# Patient Record
Sex: MALE | Race: WHITE | Employment: FULL TIME | URBAN - METROPOLITAN AREA
[De-identification: names, ages, dates, MRNs, and addresses within clinical notes are randomized per-mention and may not be internally consistent; named-entity substitution may affect disease eponyms.]

---

## 2017-02-08 NOTE — TELEPHONE ENCOUNTER
Diabetes Medications: 10/28/16 A1C 6.9 - Refilled per protocol - Due for office visit in 2 months - 2 month supply given       Protocol Criteria:  · Appointment scheduled in the past 6 months or the next 3 months  · A1C < 7.5 in the past 6 months  · Creati

## 2017-04-20 ENCOUNTER — PATIENT OUTREACH (OUTPATIENT)
Dept: INTERNAL MEDICINE CLINIC | Facility: CLINIC | Age: 47
End: 2017-04-20

## 2017-04-27 ENCOUNTER — APPOINTMENT (OUTPATIENT)
Dept: LAB | Facility: HOSPITAL | Age: 47
End: 2017-04-27
Attending: INTERNAL MEDICINE
Payer: COMMERCIAL

## 2017-04-27 ENCOUNTER — OFFICE VISIT (OUTPATIENT)
Dept: INTERNAL MEDICINE CLINIC | Facility: CLINIC | Age: 47
End: 2017-04-27

## 2017-04-27 VITALS
BODY MASS INDEX: 30.64 KG/M2 | HEART RATE: 83 BPM | DIASTOLIC BLOOD PRESSURE: 82 MMHG | HEIGHT: 70 IN | WEIGHT: 214 LBS | SYSTOLIC BLOOD PRESSURE: 126 MMHG

## 2017-04-27 DIAGNOSIS — E11.9 TYPE 2 DIABETES MELLITUS WITHOUT COMPLICATION, WITHOUT LONG-TERM CURRENT USE OF INSULIN (HCC): Primary | ICD-10-CM

## 2017-04-27 DIAGNOSIS — E11.9 TYPE 2 DIABETES MELLITUS WITHOUT COMPLICATION, WITHOUT LONG-TERM CURRENT USE OF INSULIN (HCC): ICD-10-CM

## 2017-04-27 PROCEDURE — 83036 HEMOGLOBIN GLYCOSYLATED A1C: CPT

## 2017-04-27 PROCEDURE — 36415 COLL VENOUS BLD VENIPUNCTURE: CPT

## 2017-04-27 PROCEDURE — 99213 OFFICE O/P EST LOW 20 MIN: CPT | Performed by: INTERNAL MEDICINE

## 2017-04-27 PROCEDURE — 99212 OFFICE O/P EST SF 10 MIN: CPT | Performed by: INTERNAL MEDICINE

## 2017-04-27 RX ORDER — ATORVASTATIN CALCIUM 10 MG/1
TABLET, FILM COATED ORAL
Qty: 90 TABLET | Refills: 1 | Status: SHIPPED | OUTPATIENT
Start: 2017-04-27 | End: 2017-12-13

## 2017-04-27 NOTE — PROGRESS NOTES
Mgady Garner Thea is a 48year old male. Patient presents with:  Diabetes    HPI:   Mr. Robbin Byrd presents this morning for six-month follow-up of type 2 diabetes and dyslipidemia. He feels well.   He is checking his blood sugar readings at home abou retinopathy screening. Referral completed. He will schedule. Return visit in 6 months for recheck, physical and complete labs. - Ophtho Marselle  - Hemoglobin A1C; Future      The patient indicates understanding of these issues and agrees to the plan.

## 2017-04-27 NOTE — PATIENT INSTRUCTIONS
Await results of glycohemoglobin level. Please schedule an appointment for an eye exam with Ophthalmology. Please continue current medications, healthy diet and regular exercise. Schedule a physical in 6 months.

## 2017-05-09 NOTE — PROGRESS NOTES
Chart reviewed. Pt noted to have had Hb A1c completed on 4/27/17. PLAN:  CM will remove self from Care Team as pt Hb A1c is less than 8.

## 2017-08-08 ENCOUNTER — OFFICE VISIT (OUTPATIENT)
Dept: OPHTHALMOLOGY | Facility: CLINIC | Age: 47
End: 2017-08-08

## 2017-08-08 DIAGNOSIS — E11.9 DIABETES MELLITUS TYPE 2 WITHOUT RETINOPATHY (HCC): Primary | ICD-10-CM

## 2017-08-08 PROCEDURE — 92014 COMPRE OPH EXAM EST PT 1/>: CPT | Performed by: OPHTHALMOLOGY

## 2017-08-08 NOTE — PROGRESS NOTES
Sharona Sidhu is a 50year old male.     HPI:     HPI     Diabetic Eye Exam    Additional comments: Pt has been a diabetic for 3 1/2 years  3 1/2 years on pills/  0 years on Insulin   Pt checks his BS 2-3 x a week   Pt's last blood sugar was 102 this Allergies:    Penicillins             Swelling    ROS:       PHYSICAL EXAM:     Base Eye Exam     Visual Acuity (Snellen - Linear)       Right Left    Dist cc 20/20 20/20    Near cc 20/20 20/20    Correction:  Contacts          Tonometry (Applanation instructions:  Return in about 1 year (around 8/8/2018) for Diabetic eye exam.    8/8/2017  Scribed by: Juan Manuel Owen MD

## 2017-08-08 NOTE — PATIENT INSTRUCTIONS
Diabetes mellitus type 2 without retinopathy ([facility] [address]  Diabetes type II: no background of retinopathy, no signs of neovascularization noted. Discussed ocular and systemic benefits of blood sugar control.   Diagnosis and treatment discussed in detail with juan

## 2017-12-13 ENCOUNTER — LAB ENCOUNTER (OUTPATIENT)
Dept: LAB | Facility: HOSPITAL | Age: 47
End: 2017-12-13
Attending: INTERNAL MEDICINE
Payer: COMMERCIAL

## 2017-12-13 ENCOUNTER — OFFICE VISIT (OUTPATIENT)
Dept: INTERNAL MEDICINE CLINIC | Facility: CLINIC | Age: 47
End: 2017-12-13

## 2017-12-13 VITALS
SYSTOLIC BLOOD PRESSURE: 118 MMHG | BODY MASS INDEX: 30.35 KG/M2 | HEIGHT: 70 IN | DIASTOLIC BLOOD PRESSURE: 74 MMHG | HEART RATE: 80 BPM | WEIGHT: 212 LBS

## 2017-12-13 DIAGNOSIS — Z00.00 ANNUAL PHYSICAL EXAM: Primary | ICD-10-CM

## 2017-12-13 DIAGNOSIS — E11.9 TYPE 2 DIABETES MELLITUS WITHOUT COMPLICATION, WITHOUT LONG-TERM CURRENT USE OF INSULIN (HCC): ICD-10-CM

## 2017-12-13 DIAGNOSIS — E11.69 DYSLIPIDEMIA ASSOCIATED WITH TYPE 2 DIABETES MELLITUS (HCC): ICD-10-CM

## 2017-12-13 DIAGNOSIS — E78.5 DYSLIPIDEMIA ASSOCIATED WITH TYPE 2 DIABETES MELLITUS (HCC): ICD-10-CM

## 2017-12-13 PROCEDURE — 83036 HEMOGLOBIN GLYCOSYLATED A1C: CPT

## 2017-12-13 PROCEDURE — 36415 COLL VENOUS BLD VENIPUNCTURE: CPT

## 2017-12-13 PROCEDURE — 82570 ASSAY OF URINE CREATININE: CPT

## 2017-12-13 PROCEDURE — 82043 UR ALBUMIN QUANTITATIVE: CPT

## 2017-12-13 PROCEDURE — 80053 COMPREHEN METABOLIC PANEL: CPT

## 2017-12-13 PROCEDURE — 80061 LIPID PANEL: CPT

## 2017-12-13 PROCEDURE — 99396 PREV VISIT EST AGE 40-64: CPT | Performed by: INTERNAL MEDICINE

## 2017-12-13 PROCEDURE — 85027 COMPLETE CBC AUTOMATED: CPT

## 2017-12-13 RX ORDER — ATORVASTATIN CALCIUM 10 MG/1
TABLET, FILM COATED ORAL
Qty: 90 TABLET | Refills: 1 | Status: SHIPPED | OUTPATIENT
Start: 2017-12-13 | End: 2018-06-13

## 2017-12-13 NOTE — H&P
Briseyda Sidhu is a 50year old male who presents for a complete physical exam, as well as for follow-up of type 2 diabetes and dyslipidemia. HPI:   He feels well this morning. No specific issues for discussion.   Accu-Cheks performed about 4 times Father 76   • Slude Strand 83 Sister 43   • Diabetes Paternal Grandmother    • Diabetes Paternal Grandfather    • Heart Disease Paternal Grandfather      CHF age 76s      Social History:  Smoking status: Former Smoker exam  Check CMP CBC glycohemoglobin lipid profile and urine microalbumin today. Order sent. Continue current medications. Prescription refill for atorvastatin for 6 months sent to pharmacy.   Continue healthy diet, regular exercise and at least maintenan

## 2017-12-13 NOTE — PATIENT INSTRUCTIONS
Await results of today's blood and urine testing. Please continue current medications. Continue to eat healthy and exercise regularly. Return visit in 6 months.

## 2018-06-13 ENCOUNTER — APPOINTMENT (OUTPATIENT)
Dept: LAB | Facility: HOSPITAL | Age: 48
End: 2018-06-13
Attending: INTERNAL MEDICINE
Payer: COMMERCIAL

## 2018-06-13 ENCOUNTER — OFFICE VISIT (OUTPATIENT)
Dept: INTERNAL MEDICINE CLINIC | Facility: CLINIC | Age: 48
End: 2018-06-13

## 2018-06-13 VITALS
DIASTOLIC BLOOD PRESSURE: 80 MMHG | HEART RATE: 82 BPM | BODY MASS INDEX: 28.77 KG/M2 | HEIGHT: 70 IN | WEIGHT: 201 LBS | SYSTOLIC BLOOD PRESSURE: 116 MMHG

## 2018-06-13 DIAGNOSIS — E11.69 DYSLIPIDEMIA ASSOCIATED WITH TYPE 2 DIABETES MELLITUS (HCC): ICD-10-CM

## 2018-06-13 DIAGNOSIS — E78.5 DYSLIPIDEMIA ASSOCIATED WITH TYPE 2 DIABETES MELLITUS (HCC): ICD-10-CM

## 2018-06-13 DIAGNOSIS — E11.9 TYPE 2 DIABETES MELLITUS WITHOUT COMPLICATION, WITHOUT LONG-TERM CURRENT USE OF INSULIN (HCC): Primary | ICD-10-CM

## 2018-06-13 DIAGNOSIS — Z12.11 COLON CANCER SCREENING: ICD-10-CM

## 2018-06-13 PROCEDURE — 83036 HEMOGLOBIN GLYCOSYLATED A1C: CPT | Performed by: INTERNAL MEDICINE

## 2018-06-13 PROCEDURE — 99213 OFFICE O/P EST LOW 20 MIN: CPT | Performed by: INTERNAL MEDICINE

## 2018-06-13 PROCEDURE — 36415 COLL VENOUS BLD VENIPUNCTURE: CPT | Performed by: INTERNAL MEDICINE

## 2018-06-13 PROCEDURE — 99212 OFFICE O/P EST SF 10 MIN: CPT | Performed by: INTERNAL MEDICINE

## 2018-06-13 RX ORDER — ATORVASTATIN CALCIUM 10 MG/1
TABLET, FILM COATED ORAL
Qty: 90 TABLET | Refills: 1 | Status: SHIPPED | OUTPATIENT
Start: 2018-06-13 | End: 2018-09-16

## 2018-06-13 NOTE — PROGRESS NOTES
Marcelo Sidhu is a 50year old male. Patient presents with:  Diabetes    HPI:   Liz Winn presents this morning for six-month follow-up of diabetes and dyslipidemia. For the past 4 months he has been traveling extensively for his job.   Travel has n appearing well in no distress  /80 (BP Location: Left arm, Patient Position: Sitting, Cuff Size: large)   Pulse 82   Ht 5' 10\" (1.778 m)   Wt 201 lb (91.2 kg)   BMI 28.84 kg/m²   LUNGS: Resonant to percussion and clear to auscultation  CARDIAC: Rhyt

## 2018-06-13 NOTE — PATIENT INSTRUCTIONS
Await results of today's glycohemoglobin. Continue current medication. Continue healthy diet and regular exercise with hopefully additional weight loss. Please schedule a physical in 6 months.   Schedule an appointment with GI for a screening colonoscopy

## 2018-08-10 ENCOUNTER — TELEPHONE (OUTPATIENT)
Dept: OTHER | Age: 48
End: 2018-08-10

## 2018-08-10 NOTE — TELEPHONE ENCOUNTER
Pt states he monitors his blood sugar 3-4 times a week and his monitor is failing. He is asking if there is a monitor available that doesn't require pricking the finger for a drop of blood.  Pt is going to review with pharmacy and have them send request to

## 2018-08-16 RX ORDER — ATORVASTATIN CALCIUM 10 MG/1
TABLET, FILM COATED ORAL
Qty: 90 TABLET | Refills: 1
Start: 2018-08-16

## 2018-08-16 NOTE — TELEPHONE ENCOUNTER
Cholesterol Medication Protocol Passed8/16 11:13 AM   ALT in past 12 months    LDL in past 12 months    Last ALT < 80    Last LDL < 130    Appointment in past 12 or next 3 months     [address]

## 2018-09-16 RX ORDER — ATORVASTATIN CALCIUM 10 MG/1
TABLET, FILM COATED ORAL
Qty: 90 TABLET | Refills: 0 | Status: SHIPPED | OUTPATIENT
Start: 2018-09-16 | End: 2018-12-08

## 2018-09-17 NOTE — TELEPHONE ENCOUNTER
Diabetes Medications:  Failed per nursing protocol - please advise on refill request     Protocol Criteria:  · Appointment scheduled in the past 6 months or the next 3 months  · A1C < 7.5 in the past 6 months  · Creatinine in the past 12 months  · Creatini ago Annual physical exam    Oscar Sims MD    Office Visit    1 year ago Diabetes mellitus type 2 without retinopathy [facility])    [facility]hthalmology Kateryna Mazariegos MD    Office Visit

## 2018-09-19 ENCOUNTER — OFFICE VISIT (OUTPATIENT)
Dept: OPHTHALMOLOGY | Facility: CLINIC | Age: 48
End: 2018-09-19
Payer: COMMERCIAL

## 2018-09-19 DIAGNOSIS — H52.13 MYOPIA WITH PRESBYOPIA OF BOTH EYES: ICD-10-CM

## 2018-09-19 DIAGNOSIS — E11.9 DIABETES MELLITUS TYPE 2 WITHOUT RETINOPATHY (HCC): Primary | ICD-10-CM

## 2018-09-19 DIAGNOSIS — H52.4 MYOPIA WITH PRESBYOPIA OF BOTH EYES: ICD-10-CM

## 2018-09-19 PROCEDURE — 92014 COMPRE OPH EXAM EST PT 1/>: CPT | Performed by: OPHTHALMOLOGY

## 2018-09-19 NOTE — PATIENT INSTRUCTIONS
Diabetes mellitus type 2 without retinopathy ([facility] [address]  Diabetes type II: no background of retinopathy, no signs of neovascularization noted. Discussed ocular and systemic benefits of blood sugar control.   Diagnosis and treatment discussed in detail with juan

## 2018-09-19 NOTE — PROGRESS NOTES
Briseyda Oni Uribebayleelemuel is a 46year old male.     HPI:     HPI     Diabetic Eye Exam      Additional comments: Pt has been a diabetic for 4 years  4 years on pills/ 0 years on Insulin   Pt checks his BS 2 times a day   Pt's last blood sugar was 135 this max (BLOOD GLUCOSE TEST) In Vitro Strip Check Blood sugar 3 times weekly- Accuchek Addie monitor Disp: 100 strip Rfl: 3   atorvastatin 10 MG Oral Tab TAKE 1 TABLET BY MOUTH DAILY Disp: 90 tablet Rfl: 0   Omega-3 Fatty Acids (FISH OIL) 1200 MG Oral Cap Take 1 ca treatment discussed in detail with patient. To consider referral to endocrinologist to improve A1C levels. Myopia with presbyopia of both eyes  Continue same glasses. No orders of the defined types were placed in this encounter.       Meds This

## 2018-09-19 NOTE — ASSESSMENT & PLAN NOTE
Diabetes type II: no background of retinopathy, no signs of neovascularization noted. Discussed ocular and systemic benefits of blood sugar control. Diagnosis and treatment discussed in detail with patient.   To consider referral to endocrinologist to imp

## 2018-09-24 ENCOUNTER — OFFICE VISIT (OUTPATIENT)
Dept: GASTROENTEROLOGY | Facility: CLINIC | Age: 48
End: 2018-09-24
Payer: COMMERCIAL

## 2018-09-24 ENCOUNTER — TELEPHONE (OUTPATIENT)
Dept: GASTROENTEROLOGY | Facility: CLINIC | Age: 48
End: 2018-09-24

## 2018-09-24 VITALS
HEART RATE: 93 BPM | WEIGHT: 204 LBS | HEIGHT: 69 IN | BODY MASS INDEX: 30.21 KG/M2 | DIASTOLIC BLOOD PRESSURE: 84 MMHG | SYSTOLIC BLOOD PRESSURE: 132 MMHG

## 2018-09-24 DIAGNOSIS — Z12.11 COLON CANCER SCREENING: Primary | ICD-10-CM

## 2018-09-24 DIAGNOSIS — Z12.12 SCREENING FOR COLORECTAL CANCER: Primary | ICD-10-CM

## 2018-09-24 DIAGNOSIS — Z12.11 SCREENING FOR COLORECTAL CANCER: Primary | ICD-10-CM

## 2018-09-24 PROCEDURE — 99212 OFFICE O/P EST SF 10 MIN: CPT | Performed by: INTERNAL MEDICINE

## 2018-09-24 PROCEDURE — 99243 OFF/OP CNSLTJ NEW/EST LOW 30: CPT | Performed by: INTERNAL MEDICINE

## 2018-09-24 NOTE — PATIENT INSTRUCTIONS
1.  Schedule screening colonoscopy following a split dose Suprep and either IV sedation or MAC per scheduling. 2.  Hold metformin medication the evening before and the morning of the procedure.

## 2018-09-24 NOTE — PROGRESS NOTES
HPI:    Patient ID: Lebron Sidhu is a 46year old male. HPI  The patient is referred by Dr. Radha Estrada for colorectal cancer screening. He has not had a prior colonoscopy. The patient feels \"great\". His appetite is good.   He has voluntarily lo to person, place, and time. He appears well-developed and well-nourished. No distress. HENT:   Mouth/Throat: Oropharynx is clear and moist. No oropharyngeal exudate. Eyes: Conjunctivae are normal. No scleral icterus. Neck: Neck supple.  No thyromegaly 17.5 g/dL 17.0   Hematocrit      41.0 - 52.0 % 47.2   MCV      80.0 - 100.0 fL 90.3   MCH      27.0 - 32.0 pg 32.6 (H)   MCHC      32.0 - 37.0 g/dl 36.1   RDW      11.0 - 15.0 % 13.6   Platelet Count      572 - 400 K/   MEAN PLATELET VOLUME      7.4

## 2018-11-19 ENCOUNTER — OFFICE VISIT (OUTPATIENT)
Dept: ENDOCRINOLOGY CLINIC | Facility: CLINIC | Age: 48
End: 2018-11-19
Payer: COMMERCIAL

## 2018-11-19 VITALS
WEIGHT: 205.63 LBS | HEART RATE: 84 BPM | BODY MASS INDEX: 30 KG/M2 | DIASTOLIC BLOOD PRESSURE: 84 MMHG | SYSTOLIC BLOOD PRESSURE: 131 MMHG

## 2018-11-19 DIAGNOSIS — E11.65 UNCONTROLLED TYPE 2 DIABETES MELLITUS WITH HYPERGLYCEMIA (HCC): Primary | ICD-10-CM

## 2018-11-19 PROCEDURE — 99212 OFFICE O/P EST SF 10 MIN: CPT | Performed by: INTERNAL MEDICINE

## 2018-11-19 PROCEDURE — 90471 IMMUNIZATION ADMIN: CPT | Performed by: INTERNAL MEDICINE

## 2018-11-19 PROCEDURE — 83036 HEMOGLOBIN GLYCOSYLATED A1C: CPT | Performed by: INTERNAL MEDICINE

## 2018-11-19 PROCEDURE — 36416 COLLJ CAPILLARY BLOOD SPEC: CPT | Performed by: INTERNAL MEDICINE

## 2018-11-19 PROCEDURE — 82962 GLUCOSE BLOOD TEST: CPT | Performed by: INTERNAL MEDICINE

## 2018-11-19 PROCEDURE — 99244 OFF/OP CNSLTJ NEW/EST MOD 40: CPT | Performed by: INTERNAL MEDICINE

## 2018-11-19 PROCEDURE — 90686 IIV4 VACC NO PRSV 0.5 ML IM: CPT | Performed by: INTERNAL MEDICINE

## 2018-11-19 RX ORDER — LANCETS 33 GAUGE
EACH MISCELLANEOUS
Qty: 100 EACH | Refills: 3 | Status: SHIPPED | OUTPATIENT
Start: 2018-11-19

## 2018-11-19 RX ORDER — BLOOD SUGAR DIAGNOSTIC
STRIP MISCELLANEOUS
Qty: 100 STRIP | Refills: 3 | Status: SHIPPED | OUTPATIENT
Start: 2018-11-19 | End: 2020-12-08 | Stop reason: ALTCHOICE

## 2018-11-19 NOTE — PROGRESS NOTES
Name: Jaymie Sidhu  Date: 11/19/2018    Referring Physician: No ref. provider found    HISTORY OF PRESENT ILLNESS   Jaymie Sidhu is a 46year old male who presents for diabetes mellitus, diagnosed approximately 5 years ago.      Prior HbA, C SWELLING    Social History:   Social History    Socioeconomic History      Marital status:       Spouse name: Not on file      Number of children: 3      Years of education: Not on file      Highest education level: Not on file    Social Needs bilaterally  Cardiovascular:  regular rate, rhythm, , no murmurs, S3 or S4  Gastrointestinal:  normal bowel sounds and no palpable masses in abdomen, organomegaly or tenderness   Musculoskeletal:  normal muscle strength and tone  Skin:  normal moisture and

## 2018-11-20 ENCOUNTER — APPOINTMENT (OUTPATIENT)
Dept: LAB | Facility: HOSPITAL | Age: 48
End: 2018-11-20
Attending: INTERNAL MEDICINE
Payer: COMMERCIAL

## 2018-11-20 DIAGNOSIS — E11.65 UNCONTROLLED TYPE 2 DIABETES MELLITUS WITH HYPERGLYCEMIA (HCC): ICD-10-CM

## 2018-11-20 PROCEDURE — 80053 COMPREHEN METABOLIC PANEL: CPT

## 2018-11-20 PROCEDURE — 84443 ASSAY THYROID STIM HORMONE: CPT

## 2018-11-20 PROCEDURE — 82570 ASSAY OF URINE CREATININE: CPT

## 2018-11-20 PROCEDURE — 36415 COLL VENOUS BLD VENIPUNCTURE: CPT

## 2018-11-20 PROCEDURE — 80061 LIPID PANEL: CPT

## 2018-11-20 PROCEDURE — 82043 UR ALBUMIN QUANTITATIVE: CPT

## 2018-11-26 ENCOUNTER — TELEPHONE (OUTPATIENT)
Dept: GASTROENTEROLOGY | Facility: CLINIC | Age: 48
End: 2018-11-26

## 2018-11-26 NOTE — TELEPHONE ENCOUNTER
Pharmacy contacted and asked to refill from 9/2018 sent date from our office. Patient notified prep was put back on shelf since pt didn't pickup RX but has now been refilled.

## 2018-11-26 NOTE — TELEPHONE ENCOUNTER
Pt calling to request script for rx:Prep, pt indicates script no longer available at the pharm. Pt has upcoming cln [facility] 11/30, pls call asap at:[phone],thanks.

## 2018-11-30 ENCOUNTER — ANESTHESIA (OUTPATIENT)
Dept: ENDOSCOPY | Facility: HOSPITAL | Age: 48
End: 2018-11-30
Payer: COMMERCIAL

## 2018-11-30 ENCOUNTER — HOSPITAL ENCOUNTER (OUTPATIENT)
Facility: HOSPITAL | Age: 48
Setting detail: HOSPITAL OUTPATIENT SURGERY
Discharge: HOME | End: 2018-11-30
Attending: INTERNAL MEDICINE | Admitting: INTERNAL MEDICINE
Payer: COMMERCIAL

## 2018-11-30 ENCOUNTER — ANESTHESIA EVENT (OUTPATIENT)
Dept: ENDOSCOPY | Facility: HOSPITAL | Age: 48
End: 2018-11-30
Payer: COMMERCIAL

## 2018-11-30 VITALS
OXYGEN SATURATION: 97 % | SYSTOLIC BLOOD PRESSURE: 103 MMHG | DIASTOLIC BLOOD PRESSURE: 78 MMHG | BODY MASS INDEX: 26.2 KG/M2 | HEART RATE: 69 BPM | HEIGHT: 70 IN | RESPIRATION RATE: 13 BRPM | WEIGHT: 183 LBS

## 2018-11-30 DIAGNOSIS — Z12.11 COLON CANCER SCREENING: ICD-10-CM

## 2018-11-30 DIAGNOSIS — K63.5 POLYP OF CECUM: Primary | ICD-10-CM

## 2018-11-30 PROCEDURE — 45385 COLONOSCOPY W/LESION REMOVAL: CPT | Performed by: INTERNAL MEDICINE

## 2018-11-30 PROCEDURE — 0DBH8ZX EXCISION OF CECUM, VIA NATURAL OR ARTIFICIAL OPENING ENDOSCOPIC, DIAGNOSTIC: ICD-10-PCS | Performed by: INTERNAL MEDICINE

## 2018-11-30 RX ORDER — HALOPERIDOL 5 MG/ML
0.25 INJECTION INTRAMUSCULAR ONCE AS NEEDED
Status: DISCONTINUED | OUTPATIENT
Start: 2018-11-30 | End: 2018-11-30

## 2018-11-30 RX ORDER — HYDROCODONE BITARTRATE AND ACETAMINOPHEN 5; 325 MG/1; MG/1
1 TABLET ORAL AS NEEDED
Status: DISCONTINUED | OUTPATIENT
Start: 2018-11-30 | End: 2018-11-30

## 2018-11-30 RX ORDER — DEXTROSE MONOHYDRATE 25 G/50ML
50 INJECTION, SOLUTION INTRAVENOUS
Status: DISCONTINUED | OUTPATIENT
Start: 2018-11-30 | End: 2018-11-30

## 2018-11-30 RX ORDER — MORPHINE SULFATE 4 MG/ML
2 INJECTION, SOLUTION INTRAMUSCULAR; INTRAVENOUS EVERY 10 MIN PRN
Status: DISCONTINUED | OUTPATIENT
Start: 2018-11-30 | End: 2018-11-30

## 2018-11-30 RX ORDER — HYDROCODONE BITARTRATE AND ACETAMINOPHEN 5; 325 MG/1; MG/1
2 TABLET ORAL AS NEEDED
Status: DISCONTINUED | OUTPATIENT
Start: 2018-11-30 | End: 2018-11-30

## 2018-11-30 RX ORDER — ONDANSETRON 2 MG/ML
4 INJECTION INTRAMUSCULAR; INTRAVENOUS ONCE AS NEEDED
Status: DISCONTINUED | OUTPATIENT
Start: 2018-11-30 | End: 2018-11-30

## 2018-11-30 RX ORDER — LIDOCAINE HYDROCHLORIDE 10 MG/ML
INJECTION, SOLUTION EPIDURAL; INFILTRATION; INTRACAUDAL; PERINEURAL AS NEEDED
Status: DISCONTINUED | OUTPATIENT
Start: 2018-11-30 | End: 2018-11-30 | Stop reason: SURG

## 2018-11-30 RX ORDER — NALOXONE HYDROCHLORIDE 0.4 MG/ML
80 INJECTION, SOLUTION INTRAMUSCULAR; INTRAVENOUS; SUBCUTANEOUS AS NEEDED
Status: DISCONTINUED | OUTPATIENT
Start: 2018-11-30 | End: 2018-11-30

## 2018-11-30 RX ORDER — SODIUM CHLORIDE, SODIUM LACTATE, POTASSIUM CHLORIDE, CALCIUM CHLORIDE 600; 310; 30; 20 MG/100ML; MG/100ML; MG/100ML; MG/100ML
INJECTION, SOLUTION INTRAVENOUS CONTINUOUS
Status: DISCONTINUED | OUTPATIENT
Start: 2018-11-30 | End: 2018-11-30

## 2018-11-30 RX ORDER — MORPHINE SULFATE 4 MG/ML
4 INJECTION, SOLUTION INTRAMUSCULAR; INTRAVENOUS EVERY 10 MIN PRN
Status: DISCONTINUED | OUTPATIENT
Start: 2018-11-30 | End: 2018-11-30

## 2018-11-30 RX ORDER — MIDAZOLAM HYDROCHLORIDE 1 MG/ML
INJECTION INTRAMUSCULAR; INTRAVENOUS AS NEEDED
Status: DISCONTINUED | OUTPATIENT
Start: 2018-11-30 | End: 2018-11-30 | Stop reason: SURG

## 2018-11-30 RX ORDER — MORPHINE SULFATE 10 MG/ML
6 INJECTION, SOLUTION INTRAMUSCULAR; INTRAVENOUS EVERY 10 MIN PRN
Status: DISCONTINUED | OUTPATIENT
Start: 2018-11-30 | End: 2018-11-30

## 2018-11-30 RX ADMIN — MIDAZOLAM HYDROCHLORIDE 2 MG: 1 INJECTION INTRAMUSCULAR; INTRAVENOUS at 14:03:00

## 2018-11-30 RX ADMIN — SODIUM CHLORIDE, SODIUM LACTATE, POTASSIUM CHLORIDE, CALCIUM CHLORIDE: 600; 310; 30; 20 INJECTION, SOLUTION INTRAVENOUS at 14:01:00

## 2018-11-30 RX ADMIN — SODIUM CHLORIDE, SODIUM LACTATE, POTASSIUM CHLORIDE, CALCIUM CHLORIDE: 600; 310; 30; 20 INJECTION, SOLUTION INTRAVENOUS at 14:42:00

## 2018-11-30 RX ADMIN — LIDOCAINE HYDROCHLORIDE 50 MG: 10 INJECTION, SOLUTION EPIDURAL; INFILTRATION; INTRACAUDAL; PERINEURAL at 14:04:00

## 2018-11-30 NOTE — H&P
History & Physical Examination    Patient Name: Magdy [facility]  MRN: [id number]  [facility]: [id number]  YOB: 1970    Diagnosis: Colorectal cancer screening        Medications Prior to Admission:  Dapagliflozin Propanediol (FARXIGA) 10 MG Oral HYDROcodone-acetaminophen (NORCO) 5-325 MG per tab 2 tablet 2 tablet Oral PRN   fentaNYL citrate (SUBLIMAZE) 0.05 MG/ML injection 25 mcg 25 mcg Intravenous Q5 Min PRN   fentaNYL citrate (SUBLIMAZE) 0.05 MG/ML injection 50 mcg 50 mcg Intravenous Q5 Min CT TONSILLECTOMY  1995     Family History   Problem Relation Age of Onset   • Diabetes Father    • Heart Disease Father         MI age 45   • Cancer Father 67        lung cancer   • Other (Lung Cancer) Father 76   • Cancer Sister 43        lung cancer   • Oth

## 2018-11-30 NOTE — ANESTHESIA POSTPROCEDURE EVALUATION
Patient: Germán Sidhu    Procedure Summary     Date:  11/30/18 Room / Location:  [facility] ENDOSCOPY 01 / [facility] ENDOSCOPY    Anesthesia Start:  1719 Anesthesia Stop:      Procedure:  COLONOSCOPY (N/A ) Diagnosis:       Colon cancer screening      (polyp)

## 2018-11-30 NOTE — OPERATIVE REPORT
[facility] Endoscopy Report      Date of Procedure:  11/30/18      Preoperative Diagnosis:  Colorectal cancer screening      Postoperative Diagnosis:  Diminutive cecal polyp      Procedure:    Colonoscopy with polypectomy      Surgeon:  Sandra Jaimes follow-up.         Clara Stanford MD  11/30/2018

## 2018-11-30 NOTE — ANESTHESIA PREPROCEDURE EVALUATION
Anesthesia PreOp Note    HPI:     Gali Sidhu is a 46year old male who presents for preoperative consultation requested by: Nora Hilton MD    Date of Surgery: 11/30/2018    Procedure(s):  COLONOSCOPY  Indication: Colon cancer screening w/Device Does not apply Kit Check Blood glucose 3 times weekly Disp: 1 kit Rfl: 0 Taking   Glucose Blood (BLOOD GLUCOSE TEST) In Vitro Strip Check Blood sugar 3 times weekly- Accuchek Addie monitor Disp: 100 strip Rfl: 3 Taking   atorvastatin 10 MG Oral Tab Substance and Sexual Activity      Alcohol use: Yes        Comment: Occasional      Drug use: No      Sexual activity: Yes        Partners: Female    Other Topics      Concerns:        Not on file    Social History Narrative      Not on file      Available

## 2018-12-03 ENCOUNTER — PATIENT MESSAGE (OUTPATIENT)
Dept: GASTROENTEROLOGY | Facility: CLINIC | Age: 48
End: 2018-12-03

## 2018-12-03 PROBLEM — Z86.0101 HISTORY OF ADENOMATOUS POLYP OF COLON: Status: ACTIVE | Noted: 2018-12-01

## 2018-12-03 PROBLEM — Z86.010 HISTORY OF ADENOMATOUS POLYP OF COLON: Status: ACTIVE | Noted: 2018-12-01

## 2018-12-04 ENCOUNTER — TELEPHONE (OUTPATIENT)
Dept: GASTROENTEROLOGY | Facility: CLINIC | Age: 48
End: 2018-12-04

## 2018-12-04 NOTE — TELEPHONE ENCOUNTER
----- Message from Clara Stanford MD sent at 12/3/2018  6:53 PM CST -----  Please see the following My Chart message sent to the patient:    Sally Fisher,    I left a message on your voicemail.     Your colonoscopy revealed #1 small polyp which was removed

## 2018-12-04 NOTE — TELEPHONE ENCOUNTER
From: Laura Sidhu  To: Aurelia Lemon MD  Sent: 12/3/2018 7:15 PM CST  Subject: Other    I have a question about SURGICAL PATHOLOGY TISSUE resulted on 12/3/18, 9:16 AM.    Doctor, Thank you for the message, and email.  I hope you and your fa

## 2018-12-04 NOTE — TELEPHONE ENCOUNTER
Entered into Epic:Recall colon in 5 years per Dr. Jae Padron. Last Colon done 11/30/18, next due 11/30/23. Snapshot updated.

## 2018-12-09 DIAGNOSIS — E11.65 UNCONTROLLED TYPE 2 DIABETES MELLITUS WITH HYPERGLYCEMIA (HCC): ICD-10-CM

## 2018-12-09 RX ORDER — ATORVASTATIN CALCIUM 10 MG/1
TABLET, FILM COATED ORAL
Qty: 90 TABLET | Refills: 3 | Status: SHIPPED | OUTPATIENT
Start: 2018-12-09 | End: 2019-12-14

## 2019-01-01 NOTE — TELEPHONE ENCOUNTER
Please review; protocol failed.     Diabetes Medications  Protocol Criteria:  · Appointment scheduled in the past 6 months or the next 3 months  · A1C < 7.5 in the past 6 months  · Creatinine in the past 12 months  · Creatinine result < 1.5   Recent Outpati
Statement Selected

## 2019-04-17 DIAGNOSIS — E11.65 UNCONTROLLED TYPE 2 DIABETES MELLITUS WITH HYPERGLYCEMIA (HCC): ICD-10-CM

## 2019-04-17 RX ORDER — DAPAGLIFLOZIN 10 MG/1
TABLET, FILM COATED ORAL
Qty: 30 TABLET | Refills: 2 | Status: SHIPPED | OUTPATIENT
Start: 2019-04-17 | End: 2019-06-27

## 2019-04-17 NOTE — TELEPHONE ENCOUNTER
LOV 11/19/2018. Follow up booked 4/29. Per [facility] protocol can refill through upcoming appt.

## 2019-06-27 ENCOUNTER — OFFICE VISIT (OUTPATIENT)
Dept: ENDOCRINOLOGY CLINIC | Facility: CLINIC | Age: 49
End: 2019-06-27
Payer: COMMERCIAL

## 2019-06-27 VITALS
DIASTOLIC BLOOD PRESSURE: 85 MMHG | BODY MASS INDEX: 29 KG/M2 | SYSTOLIC BLOOD PRESSURE: 135 MMHG | HEART RATE: 80 BPM | WEIGHT: 201 LBS

## 2019-06-27 DIAGNOSIS — E11.65 UNCONTROLLED TYPE 2 DIABETES MELLITUS WITH HYPERGLYCEMIA (HCC): Primary | ICD-10-CM

## 2019-06-27 PROCEDURE — 36416 COLLJ CAPILLARY BLOOD SPEC: CPT | Performed by: INTERNAL MEDICINE

## 2019-06-27 PROCEDURE — 82962 GLUCOSE BLOOD TEST: CPT | Performed by: INTERNAL MEDICINE

## 2019-06-27 PROCEDURE — 99213 OFFICE O/P EST LOW 20 MIN: CPT | Performed by: INTERNAL MEDICINE

## 2019-06-27 PROCEDURE — 83036 HEMOGLOBIN GLYCOSYLATED A1C: CPT | Performed by: INTERNAL MEDICINE

## 2019-06-27 NOTE — PROGRESS NOTES
Name: Serjio Sidhu  Date: 6/27/2019    Referring Physician: No ref. provider found    HISTORY OF PRESENT ILLNESS   Serjio Sidhu is a 46year old male who presents for diabetes mellitus, diagnosed approximately 5 years ago.      Prior HbA, C GLUCOSE TEST) In Vitro Strip, Check Blood sugar 3 times weekly- Accuchek Addie monitor, Disp: 100 strip, Rfl: 3  •  Omega-3 Fatty Acids (FISH OIL) 1200 MG Oral Cap, Take 1 capsule by mouth daily. , Disp: , Rfl:   •  aspirin 81 MG Oral Tab, Take 81 mg by mout lymphadenopathy  Back: no kyphosis or back tenderness  Respiratory:  clear to auscultation bilaterally  Cardiovascular:  regular rate, rhythm, , no murmurs, S3 or S4  Gastrointestinal:  normal bowel sounds and no palpable masses in abdomen, organomegaly or

## 2019-09-24 ENCOUNTER — OFFICE VISIT (OUTPATIENT)
Dept: OPHTHALMOLOGY | Facility: CLINIC | Age: 49
End: 2019-09-24
Payer: COMMERCIAL

## 2019-09-24 DIAGNOSIS — H52.4 MYOPIA WITH PRESBYOPIA OF BOTH EYES: ICD-10-CM

## 2019-09-24 DIAGNOSIS — E11.9 DIABETES MELLITUS TYPE 2 WITHOUT RETINOPATHY (HCC): Primary | ICD-10-CM

## 2019-09-24 DIAGNOSIS — H52.13 MYOPIA WITH PRESBYOPIA OF BOTH EYES: ICD-10-CM

## 2019-09-24 PROCEDURE — 92014 COMPRE OPH EXAM EST PT 1/>: CPT | Performed by: OPHTHALMOLOGY

## 2019-09-24 NOTE — PATIENT INSTRUCTIONS
Diabetes mellitus type 2 without retinopathy ([facility] [address]  Diabetes type II: no background of retinopathy, no signs of neovascularization noted. Discussed ocular and systemic benefits of blood sugar control.   Diagnosis and treatment discussed in detail with juan

## 2019-09-24 NOTE — PROGRESS NOTES
Serjio Sidhu is a 44year old male.     HPI:     HPI     Diabetic Eye Exam      Additional comments: Pt has been a diabetic for 7 years  7 years on pills/  0  years on Insulin   Pt checks his/her BS 3-4 times a week   Pt's last blood sugar was  107 Drug use: No      Medications:    Current Outpatient Medications:  Dapagliflozin Propanediol (FARXIGA) 10 MG Oral Tab TAKE 1 TABLET BY MOUTH ONE TIME A DAY Disp: 90 tablet Rfl: 2   METFORMIN HCL 1000 MG Oral Tab TAKE 1 TABLET BY MOUTH TWO TIMES A DAY WITH Clear Clear    Anterior Chamber Deep and quiet Deep and quiet    Iris Normal Normal    Lens Clear Clear    Vitreous Clear Clear          Fundus Exam       Right Left    Disc Sloping margin, Temporal crescent Sloping margin, Temporal crescent    C/D Ratio 0

## 2019-11-25 ENCOUNTER — OFFICE VISIT (OUTPATIENT)
Dept: FAMILY MEDICINE CLINIC | Facility: CLINIC | Age: 49
End: 2019-11-25
Payer: COMMERCIAL

## 2019-11-25 VITALS
TEMPERATURE: 98 F | OXYGEN SATURATION: 98 % | HEIGHT: 70 IN | DIASTOLIC BLOOD PRESSURE: 88 MMHG | HEART RATE: 86 BPM | SYSTOLIC BLOOD PRESSURE: 132 MMHG | RESPIRATION RATE: 16 BRPM | WEIGHT: 202 LBS | BODY MASS INDEX: 28.92 KG/M2

## 2019-11-25 DIAGNOSIS — Z00.00 ROUTINE GENERAL MEDICAL EXAMINATION AT A HEALTH CARE FACILITY: Primary | ICD-10-CM

## 2019-11-25 DIAGNOSIS — E11.9 TYPE 2 DIABETES MELLITUS WITHOUT COMPLICATION, WITHOUT LONG-TERM CURRENT USE OF INSULIN (HCC): ICD-10-CM

## 2019-11-25 PROCEDURE — 99396 PREV VISIT EST AGE 40-64: CPT | Performed by: FAMILY MEDICINE

## 2019-11-25 PROCEDURE — 90471 IMMUNIZATION ADMIN: CPT | Performed by: FAMILY MEDICINE

## 2019-11-25 PROCEDURE — 90472 IMMUNIZATION ADMIN EACH ADD: CPT | Performed by: FAMILY MEDICINE

## 2019-11-25 PROCEDURE — 90686 IIV4 VACC NO PRSV 0.5 ML IM: CPT | Performed by: FAMILY MEDICINE

## 2019-11-25 PROCEDURE — 90670 PCV13 VACCINE IM: CPT | Performed by: FAMILY MEDICINE

## 2019-11-25 RX ORDER — LISINOPRIL 5 MG/1
5 TABLET ORAL DAILY
Qty: 90 TABLET | Refills: 3 | Status: SHIPPED | OUTPATIENT
Start: 2019-11-25 | End: 2020-02-23

## 2019-11-25 NOTE — PROGRESS NOTES
New patient arrives today for physical sees an endocrinologist and a primary care doctor separately. 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 Positive family history of premature cardiovascular disease.   Up-to-date on most all vaccines thoug

## 2019-12-16 RX ORDER — ATORVASTATIN CALCIUM 10 MG/1
TABLET, FILM COATED ORAL
Qty: 90 TABLET | Refills: 0 | Status: SHIPPED | OUTPATIENT
Start: 2019-12-16 | End: 2020-02-26

## 2020-01-06 ENCOUNTER — OFFICE VISIT (OUTPATIENT)
Dept: ENDOCRINOLOGY CLINIC | Facility: CLINIC | Age: 50
End: 2020-01-06
Payer: COMMERCIAL

## 2020-01-06 VITALS
DIASTOLIC BLOOD PRESSURE: 85 MMHG | HEART RATE: 79 BPM | BODY MASS INDEX: 28 KG/M2 | WEIGHT: 198 LBS | SYSTOLIC BLOOD PRESSURE: 127 MMHG

## 2020-01-06 DIAGNOSIS — E11.65 UNCONTROLLED TYPE 2 DIABETES MELLITUS WITH HYPERGLYCEMIA (HCC): Primary | ICD-10-CM

## 2020-01-06 LAB
CARTRIDGE LOT#: ABNORMAL NUMERIC
GLUCOSE BLOOD: 149
HEMOGLOBIN A1C: 6.5 % (ref 4.3–5.6)
TEST STRIP LOT #: NORMAL NUMERIC

## 2020-01-06 PROCEDURE — 82962 GLUCOSE BLOOD TEST: CPT | Performed by: INTERNAL MEDICINE

## 2020-01-06 PROCEDURE — 99213 OFFICE O/P EST LOW 20 MIN: CPT | Performed by: INTERNAL MEDICINE

## 2020-01-06 PROCEDURE — 83036 HEMOGLOBIN GLYCOSYLATED A1C: CPT | Performed by: INTERNAL MEDICINE

## 2020-01-06 PROCEDURE — 36416 COLLJ CAPILLARY BLOOD SPEC: CPT | Performed by: INTERNAL MEDICINE

## 2020-01-06 NOTE — PROGRESS NOTES
Name: Kathe Sidhu  Date: 1/6/2020    Referring Physician: No ref. provider found    HISTORY OF PRESENT ILLNESS   Kathe Sidhu is a 44year old male who presents for diabetes mellitus, diagnosed approximately 5 years ago.      Prior HbA, C o GLUCOSE TEST) In Vitro Strip, Check Blood sugar 3 times weekly- Accuchek Addie monitor, Disp: 100 strip, Rfl: 3  •  Omega-3 Fatty Acids (FISH OIL) 1200 MG Oral Cap, Take 1 capsule by mouth daily. , Disp: , Rfl:   •  aspirin 81 MG Oral Tab, Take 81 mg by mout lymphadenopathy  Back: no kyphosis or back tenderness  Respiratory:  clear to auscultation bilaterally  Cardiovascular:  regular rate, rhythm, , no murmurs, S3 or S4  Gastrointestinal:  normal bowel sounds and no palpable masses in abdomen, organomegaly or

## 2020-01-13 ENCOUNTER — TELEPHONE (OUTPATIENT)
Dept: ENDOCRINOLOGY CLINIC | Facility: CLINIC | Age: 50
End: 2020-01-13

## 2020-01-13 ENCOUNTER — TELEPHONE (OUTPATIENT)
Dept: OTHER | Age: 50
End: 2020-01-13

## 2020-01-13 NOTE — TELEPHONE ENCOUNTER
Pt reports he was unable to get [place] Rx. Pharmacy told him our office needs to contact them regarding approval for [place]. Pt has 1 week of med left. Spoke with pharmacy who states they need PA for med which was apparently faxed to us.  Asked them to

## 2020-01-13 NOTE — TELEPHONE ENCOUNTER
Pt called to speak to RN about a problem getting his [place] RX refill. Please call.           Current Outpatient Medications:     •  Dapagliflozin Propanediol (FARXIGA) 10 MG Oral Tab, TAKE 1 TABLET BY MOUTH ONE TIME A DAY, Disp: 90 tablet, Rfl: 2

## 2020-01-13 NOTE — TELEPHONE ENCOUNTER
Pt calling has questions regarding farxiga medication which has been prescribed by Dr. Lucas Rutledge office. Transferred pt to endocrinology to discuss with Dr. Lucas Rutledge nurse. Pt has Dr. Leticia Hu listed now as PCP.

## 2020-01-13 NOTE — TELEPHONE ENCOUNTER
Fax received from pharmacy stating MD to call [phone]. Attempted to fill out PA on [employer], but will not accept pt's member ID. Attempted to call [employer] to inquire about member ID, unable to get through after 15 minutes.  Spoke with pt who is on the

## 2020-01-19 DIAGNOSIS — E11.65 UNCONTROLLED TYPE 2 DIABETES MELLITUS WITH HYPERGLYCEMIA (HCC): ICD-10-CM

## 2020-01-20 NOTE — TELEPHONE ENCOUNTER
PA approved for Farxiga 10mg tabs, from 01/17/2020 to 01/16/2021. Request ID: [id number]. Patient informed via [address] [place] [address].

## 2020-01-22 NOTE — TELEPHONE ENCOUNTER
Patient was notified and refused to schedule follow up, stated he will discuss medication with Dr. Brenda Saeed

## 2020-02-07 ENCOUNTER — APPOINTMENT (OUTPATIENT)
Dept: LAB | Facility: HOSPITAL | Age: 50
End: 2020-02-07
Attending: INTERNAL MEDICINE
Payer: COMMERCIAL

## 2020-02-07 ENCOUNTER — TELEPHONE (OUTPATIENT)
Dept: ENDOCRINOLOGY CLINIC | Facility: CLINIC | Age: 50
End: 2020-02-07

## 2020-02-07 DIAGNOSIS — E11.65 UNCONTROLLED TYPE 2 DIABETES MELLITUS WITH HYPERGLYCEMIA (HCC): ICD-10-CM

## 2020-02-07 LAB
ALBUMIN SERPL-MCNC: 4 G/DL (ref 3.4–5)
ALBUMIN/GLOB SERPL: 1.1 {RATIO} (ref 1–2)
ALP LIVER SERPL-CCNC: 60 U/L (ref 45–117)
ALT SERPL-CCNC: 43 U/L (ref 16–61)
ANION GAP SERPL CALC-SCNC: 8 MMOL/L (ref 0–18)
AST SERPL-CCNC: 42 U/L (ref 15–37)
BILIRUB SERPL-MCNC: 1.2 MG/DL (ref 0.1–2)
BUN BLD-MCNC: 17 MG/DL (ref 7–18)
BUN/CREAT SERPL: 17.7 (ref 10–20)
CALCIUM BLD-MCNC: 8.9 MG/DL (ref 8.5–10.1)
CHLORIDE SERPL-SCNC: 106 MMOL/L (ref 98–112)
CHOLEST SMN-MCNC: 132 MG/DL (ref ?–200)
CO2 SERPL-SCNC: 25 MMOL/L (ref 21–32)
CREAT BLD-MCNC: 0.96 MG/DL (ref 0.7–1.3)
CREAT UR-SCNC: 91.3 MG/DL
GLOBULIN PLAS-MCNC: 3.6 G/DL (ref 2.8–4.4)
GLUCOSE BLD-MCNC: 194 MG/DL (ref 70–99)
HDLC SERPL-MCNC: 37 MG/DL (ref 40–59)
LDLC SERPL CALC-MCNC: 69 MG/DL (ref ?–100)
M PROTEIN MFR SERPL ELPH: 7.6 G/DL (ref 6.4–8.2)
MICROALBUMIN UR-MCNC: 1.97 MG/DL
MICROALBUMIN/CREAT 24H UR-RTO: 21.6 UG/MG (ref ?–30)
NONHDLC SERPL-MCNC: 95 MG/DL (ref ?–130)
OSMOLALITY SERPL CALC.SUM OF ELEC: 295 MOSM/KG (ref 275–295)
PATIENT FASTING Y/N/NP: YES
PATIENT FASTING Y/N/NP: YES
POTASSIUM SERPL-SCNC: 3.8 MMOL/L (ref 3.5–5.1)
SODIUM SERPL-SCNC: 139 MMOL/L (ref 136–145)
TRIGL SERPL-MCNC: 132 MG/DL (ref 30–149)
TSI SER-ACNC: 1.68 MIU/ML (ref 0.36–3.74)
VLDLC SERPL CALC-MCNC: 26 MG/DL (ref 0–30)

## 2020-02-07 PROCEDURE — 84443 ASSAY THYROID STIM HORMONE: CPT

## 2020-02-07 PROCEDURE — 80053 COMPREHEN METABOLIC PANEL: CPT

## 2020-02-07 PROCEDURE — 82570 ASSAY OF URINE CREATININE: CPT | Performed by: INTERNAL MEDICINE

## 2020-02-07 PROCEDURE — 82043 UR ALBUMIN QUANTITATIVE: CPT | Performed by: INTERNAL MEDICINE

## 2020-02-07 PROCEDURE — 80061 LIPID PANEL: CPT

## 2020-02-07 PROCEDURE — 36415 COLL VENOUS BLD VENIPUNCTURE: CPT

## 2020-02-07 NOTE — TELEPHONE ENCOUNTER
Form faxed to [facility] [phone], fax confirmation received. Form sent to scanning.

## 2020-02-07 NOTE — TELEPHONE ENCOUNTER
Dr. Angel Luis Hirsch,     Pls see below message. Pt states you were aware he was bringing this form in. Form completed and placed on your desk for review and signature. Thank you.

## 2020-02-19 ENCOUNTER — OFFICE VISIT (OUTPATIENT)
Dept: FAMILY MEDICINE CLINIC | Facility: CLINIC | Age: 50
End: 2020-02-19
Payer: COMMERCIAL

## 2020-02-19 VITALS
SYSTOLIC BLOOD PRESSURE: 122 MMHG | DIASTOLIC BLOOD PRESSURE: 78 MMHG | OXYGEN SATURATION: 98 % | WEIGHT: 204 LBS | TEMPERATURE: 98 F | HEART RATE: 83 BPM | BODY MASS INDEX: 29 KG/M2

## 2020-02-19 DIAGNOSIS — J01.00 ACUTE NON-RECURRENT MAXILLARY SINUSITIS: Primary | ICD-10-CM

## 2020-02-19 DIAGNOSIS — R05.9 COUGH: ICD-10-CM

## 2020-02-19 PROCEDURE — 99213 OFFICE O/P EST LOW 20 MIN: CPT | Performed by: PHYSICIAN ASSISTANT

## 2020-02-19 RX ORDER — FLUTICASONE PROPIONATE 50 MCG
2 SPRAY, SUSPENSION (ML) NASAL DAILY
Qty: 1 INHALER | Refills: 0 | Status: SHIPPED | OUTPATIENT
Start: 2020-02-19 | End: 2020-12-08 | Stop reason: ALTCHOICE

## 2020-02-19 RX ORDER — BENZONATATE 200 MG/1
200 CAPSULE ORAL 3 TIMES DAILY PRN
Qty: 30 CAPSULE | Refills: 0 | Status: SHIPPED | OUTPATIENT
Start: 2020-02-19 | End: 2020-05-29 | Stop reason: ALTCHOICE

## 2020-02-19 RX ORDER — DOXYCYCLINE HYCLATE 100 MG
100 TABLET ORAL 2 TIMES DAILY
Qty: 20 TABLET | Refills: 0 | Status: SHIPPED | OUTPATIENT
Start: 2020-02-19 | End: 2020-12-08 | Stop reason: ALTCHOICE

## 2020-02-20 NOTE — PROGRESS NOTES
CHIEF COMPLAINT:   Patient presents with:  Sinus Problem      HPI:   Bret Sidhu is a 44year old male who presents for sinus and cough symptoms for  1  months.   He says he started with bronchial type symptoms initially around Thanksgiving and rec VERIO) In Vitro Strip Check 2 times per day 100 strip 3   • Blood Glucose Monitoring Suppl (ACCU-CHEK MATIAS SMARTVIEW) w/Device Does not apply Kit Check Blood glucose 3 times weekly 1 kit 0   • Glucose Blood (BLOOD GLUCOSE TEST) In Vitro Strip Check Blood s shortness of breath or wheezing, See HPI  CARDIOVASCULAR: denies chest pain or palpitations   GI: denies N/V/C or abdominal pain  NEURO: + sinus headaches. No numbness or tingling in face.     EXAM:   /78   Pulse 83   Temp 98 °F (36.7 °C) (Oral)   Wt Cap 30 capsule 0     Sig: Take 1 capsule (200 mg total) by mouth 3 (three) times daily as needed. Risks, benefits, side effects of medication addressed and explained. There are no Patient Instructions on file for this visit.     The patient indicat

## 2020-02-24 RX ORDER — LISINOPRIL 5 MG/1
5 TABLET ORAL DAILY
Qty: 90 TABLET | Refills: 3 | Status: SHIPPED | OUTPATIENT
Start: 2020-02-24 | End: 2020-02-26

## 2020-02-24 RX ORDER — ATORVASTATIN CALCIUM 10 MG/1
TABLET, FILM COATED ORAL
Qty: 90 TABLET | Refills: 0 | OUTPATIENT
Start: 2020-02-24

## 2020-02-26 ENCOUNTER — PATIENT MESSAGE (OUTPATIENT)
Dept: FAMILY MEDICINE CLINIC | Facility: CLINIC | Age: 50
End: 2020-02-26

## 2020-02-26 NOTE — TELEPHONE ENCOUNTER
Pt called   He wants Dr. Shavonne Claros to now manage his ATORVASTATIN 10 MG Oral Tab. He needs #90 sent to the [facility] in [facility].   Pls advise

## 2020-02-26 NOTE — TELEPHONE ENCOUNTER
From: Lebron Sidhu  To: Raghav Friday, DO  Sent: 2/26/2020 2:40 PM CST  Subject: Prescription Question    Good afternoon Doctor it's Elonda Silence I have a question can you give me a call on my cell phone [phone].  Thank you so much

## 2020-02-27 RX ORDER — LISINOPRIL 5 MG/1
5 TABLET ORAL DAILY
Qty: 90 TABLET | Refills: 3 | Status: SHIPPED | OUTPATIENT
Start: 2020-02-27 | End: 2020-12-08 | Stop reason: ALTCHOICE

## 2020-02-27 RX ORDER — ATORVASTATIN CALCIUM 10 MG/1
TABLET, FILM COATED ORAL
Qty: 90 TABLET | Refills: 0 | Status: SHIPPED | OUTPATIENT
Start: 2020-02-27 | End: 2020-06-29

## 2020-03-05 ENCOUNTER — HOSPITAL ENCOUNTER (EMERGENCY)
Age: 50
Discharge: HOME | End: 2020-03-05
Payer: COMMERCIAL

## 2020-03-05 VITALS
HEIGHT: 69 IN | TEMPERATURE: 97 F | HEART RATE: 98 BPM | BODY MASS INDEX: 28.88 KG/M2 | WEIGHT: 195 LBS | OXYGEN SATURATION: 98 % | DIASTOLIC BLOOD PRESSURE: 88 MMHG | RESPIRATION RATE: 18 BRPM | SYSTOLIC BLOOD PRESSURE: 131 MMHG

## 2020-03-05 DIAGNOSIS — J01.01 ACUTE RECURRENT MAXILLARY SINUSITIS: Primary | ICD-10-CM

## 2020-03-05 PROCEDURE — 99283 EMERGENCY DEPT VISIT LOW MDM: CPT

## 2020-03-05 RX ORDER — AZITHROMYCIN 250 MG/1
TABLET, FILM COATED ORAL
Qty: 1 PACKAGE | Refills: 0 | Status: SHIPPED | OUTPATIENT
Start: 2020-03-05 | End: 2020-03-10

## 2020-03-05 RX ORDER — METHYLPREDNISOLONE 4 MG/1
TABLET ORAL
Qty: 1 PACKAGE | Refills: 0 | Status: SHIPPED | OUTPATIENT
Start: 2020-03-05 | End: 2020-05-29 | Stop reason: ALTCHOICE

## 2020-03-05 NOTE — ED INITIAL ASSESSMENT (HPI)
SEEN AT URGENT CARE 3 WKS AGO FOR COUGH. PT FELT BETTER, NOW C/O FEVER FOR TWO DAYS AFTER DAUGHTER HAVING SAME SX. POST NASAL DRIP YELLOW SINUS DRAINAGE +COUGH. YELLOW. PT DENIES VOMITING OR DIARRHEA.

## 2020-03-05 NOTE — ED PROVIDER NOTES
Patient Seen in: [facility] [place]      History   No chief complaint on file.     Stated Complaint: fever/cough/congestion    12-year-old male presents today with plaints of worsening of sinus pressure congestion and change of colo Types: Cigarettes        Quit date: 1/1/2005        Years since quitting: 15.1      Smokeless tobacco: Never Used    Alcohol use: Yes      Comment: Occasional    Drug use:  No             Review of Systems    Positive for stated complaint: fever/cough/conge green.  Does have increased pressure over the maxillary sinuses. Patient given prescription for Z-Aaron as well as a Medrol Dosepak. Encouraged to push fluids rest use over-the-counter antihistamine decongestant.   Follow primary MD in 5 to 7 days if sympto

## 2020-05-29 ENCOUNTER — OFFICE VISIT (OUTPATIENT)
Dept: FAMILY MEDICINE CLINIC | Facility: CLINIC | Age: 50
End: 2020-05-29
Payer: COMMERCIAL

## 2020-05-29 VITALS
HEART RATE: 76 BPM | WEIGHT: 200 LBS | SYSTOLIC BLOOD PRESSURE: 122 MMHG | DIASTOLIC BLOOD PRESSURE: 84 MMHG | TEMPERATURE: 97 F | HEIGHT: 69 IN | BODY MASS INDEX: 29.62 KG/M2 | OXYGEN SATURATION: 98 % | RESPIRATION RATE: 16 BRPM

## 2020-05-29 DIAGNOSIS — T88.7XXA ADVERSE DRUG EVENT: Primary | ICD-10-CM

## 2020-05-29 PROCEDURE — 99213 OFFICE O/P EST LOW 20 MIN: CPT | Performed by: FAMILY MEDICINE

## 2020-05-29 NOTE — PROGRESS NOTES
Presents for follow-up we recently placed him on very low-dose of lisinopril for cardiac and cerebral and renal protection but he unfortunately developed a uncomplicated cough which required successfully the discontinuation of the ACE inhibitor we have had

## 2020-06-29 DIAGNOSIS — E11.65 UNCONTROLLED TYPE 2 DIABETES MELLITUS WITH HYPERGLYCEMIA (HCC): ICD-10-CM

## 2020-06-29 RX ORDER — ATORVASTATIN CALCIUM 10 MG/1
TABLET, FILM COATED ORAL
Qty: 90 TABLET | Refills: 0 | Status: SHIPPED | OUTPATIENT
Start: 2020-06-29 | End: 2020-09-24

## 2020-07-03 ENCOUNTER — TELEPHONE (OUTPATIENT)
Dept: ENDOCRINOLOGY CLINIC | Facility: CLINIC | Age: 50
End: 2020-07-03

## 2020-07-03 NOTE — TELEPHONE ENCOUNTER
Received Fax from [employer] requesting PA for [place] due to pt having new insurance. Called [phone] to start new PA. Spoke with Carisa.  Was advised that Farxiga 10MG is now on pts formulary starting July 1st and that there is a paid claim toda

## 2020-07-06 ENCOUNTER — OFFICE VISIT (OUTPATIENT)
Dept: ENDOCRINOLOGY CLINIC | Facility: CLINIC | Age: 50
End: 2020-07-06
Payer: COMMERCIAL

## 2020-07-06 VITALS
WEIGHT: 198 LBS | BODY MASS INDEX: 29 KG/M2 | HEART RATE: 71 BPM | DIASTOLIC BLOOD PRESSURE: 74 MMHG | SYSTOLIC BLOOD PRESSURE: 124 MMHG

## 2020-07-06 DIAGNOSIS — E11.65 UNCONTROLLED TYPE 2 DIABETES MELLITUS WITH HYPERGLYCEMIA (HCC): ICD-10-CM

## 2020-07-06 DIAGNOSIS — E11.9 CONTROLLED TYPE 2 DIABETES MELLITUS WITHOUT COMPLICATION, WITHOUT LONG-TERM CURRENT USE OF INSULIN (HCC): Primary | ICD-10-CM

## 2020-07-06 LAB
CARTRIDGE LOT#: ABNORMAL NUMERIC
GLUCOSE BLOOD: 199
HEMOGLOBIN A1C: 6.9 % (ref 4.3–5.6)
TEST STRIP LOT #: NORMAL NUMERIC

## 2020-07-06 PROCEDURE — 82962 GLUCOSE BLOOD TEST: CPT | Performed by: INTERNAL MEDICINE

## 2020-07-06 PROCEDURE — 36416 COLLJ CAPILLARY BLOOD SPEC: CPT | Performed by: INTERNAL MEDICINE

## 2020-07-06 PROCEDURE — 83036 HEMOGLOBIN GLYCOSYLATED A1C: CPT | Performed by: INTERNAL MEDICINE

## 2020-07-06 PROCEDURE — 99213 OFFICE O/P EST LOW 20 MIN: CPT | Performed by: INTERNAL MEDICINE

## 2020-07-06 NOTE — PROGRESS NOTES
Name: Laura Sidhu  Date: 7/6/2020    Referring Physician: No ref. provider found    HISTORY OF PRESENT ILLNESS   Laura Sidhu is a 44year old male who presents for diabetes mellitus, diagnosed approximately 5 years ago.      Prior HbA, C o (FISH OIL) 1200 MG Oral Cap, Take 1 capsule by mouth daily. , Disp: , Rfl:   •  aspirin 81 MG Oral Tab, Take 81 mg by mouth daily. , Disp: , Rfl:   •  lisinopril 5 MG Oral Tab, Take 1 tablet (5 mg total) by mouth daily.  (Patient not taking: Reported on 7/6/2 Left 2010   • KNEE SURGERY Right 1994    ACL repair   • OTHER  1992    Deviated septum   • TONSILLECTOMY  1995         PHYSICAL EXAM  /74   Pulse 71   Wt 198 lb (89.8 kg)   BMI 29.24 kg/m²     General Appearance:  alert, well developed, in no acute d

## 2020-09-24 DIAGNOSIS — E11.65 UNCONTROLLED TYPE 2 DIABETES MELLITUS WITH HYPERGLYCEMIA (HCC): ICD-10-CM

## 2020-09-24 RX ORDER — ATORVASTATIN CALCIUM 10 MG/1
TABLET, FILM COATED ORAL
Qty: 90 TABLET | Refills: 0 | Status: SHIPPED | OUTPATIENT
Start: 2020-09-24 | End: 2020-09-24

## 2020-09-24 RX ORDER — ATORVASTATIN CALCIUM 10 MG/1
TABLET, FILM COATED ORAL
Qty: 90 TABLET | Refills: 0 | Status: SHIPPED | OUTPATIENT
Start: 2020-09-24 | End: 2020-12-08

## 2020-09-29 ENCOUNTER — TELEPHONE (OUTPATIENT)
Dept: OPHTHALMOLOGY | Facility: CLINIC | Age: 50
End: 2020-09-29

## 2020-09-29 NOTE — TELEPHONE ENCOUNTER
Pt called to cancel same day appointment due to a family emergency.   Pt rescheduled appointment to 12-8-20 at 1:00pm.

## 2020-12-08 ENCOUNTER — OFFICE VISIT (OUTPATIENT)
Dept: FAMILY MEDICINE CLINIC | Facility: CLINIC | Age: 50
End: 2020-12-08
Payer: COMMERCIAL

## 2020-12-08 ENCOUNTER — OFFICE VISIT (OUTPATIENT)
Dept: OPHTHALMOLOGY | Facility: CLINIC | Age: 50
End: 2020-12-08
Payer: COMMERCIAL

## 2020-12-08 VITALS
SYSTOLIC BLOOD PRESSURE: 120 MMHG | HEART RATE: 90 BPM | DIASTOLIC BLOOD PRESSURE: 84 MMHG | WEIGHT: 196 LBS | BODY MASS INDEX: 29.03 KG/M2 | OXYGEN SATURATION: 98 % | RESPIRATION RATE: 16 BRPM | HEIGHT: 69 IN

## 2020-12-08 DIAGNOSIS — E78.5 HYPERLIPIDEMIA, UNSPECIFIED HYPERLIPIDEMIA TYPE: Primary | ICD-10-CM

## 2020-12-08 DIAGNOSIS — E11.9 DIABETES MELLITUS TYPE 2 WITHOUT RETINOPATHY (HCC): Primary | ICD-10-CM

## 2020-12-08 DIAGNOSIS — H52.4 MYOPIA WITH PRESBYOPIA OF BOTH EYES: ICD-10-CM

## 2020-12-08 DIAGNOSIS — Z12.5 SCREENING FOR PROSTATE CANCER: ICD-10-CM

## 2020-12-08 DIAGNOSIS — H52.13 MYOPIA WITH PRESBYOPIA OF BOTH EYES: ICD-10-CM

## 2020-12-08 PROCEDURE — 99213 OFFICE O/P EST LOW 20 MIN: CPT | Performed by: FAMILY MEDICINE

## 2020-12-08 PROCEDURE — 90471 IMMUNIZATION ADMIN: CPT | Performed by: FAMILY MEDICINE

## 2020-12-08 PROCEDURE — 3074F SYST BP LT 130 MM HG: CPT | Performed by: FAMILY MEDICINE

## 2020-12-08 PROCEDURE — 3079F DIAST BP 80-89 MM HG: CPT | Performed by: FAMILY MEDICINE

## 2020-12-08 PROCEDURE — 92014 COMPRE OPH EXAM EST PT 1/>: CPT | Performed by: OPHTHALMOLOGY

## 2020-12-08 PROCEDURE — 3008F BODY MASS INDEX DOCD: CPT | Performed by: FAMILY MEDICINE

## 2020-12-08 PROCEDURE — 90686 IIV4 VACC NO PRSV 0.5 ML IM: CPT | Performed by: FAMILY MEDICINE

## 2020-12-08 RX ORDER — ATORVASTATIN CALCIUM 10 MG/1
TABLET, FILM COATED ORAL
Qty: 90 TABLET | Refills: 3 | Status: SHIPPED | OUTPATIENT
Start: 2020-12-08 | End: 2021-04-22

## 2020-12-08 RX ORDER — ASCORBIC ACID 500 MG
TABLET ORAL
COMMUNITY

## 2020-12-08 NOTE — PROGRESS NOTES
[facility] Progress Note    SUBJECTIVE: Bret Sara Thea 44year old male is here today for Patient presents with:  Diabetes      Had been seeing Dr. Keagan Rogers for 1.5 years. Sees endocrine.     Was lupis about a year ago, November 2019, was sarahy lumps  Vision/hearing: no changes  Genital/urinary: no changes or concerns  Musculoskeletal: No swelling in joints, no myalgia      OBJECTIVE:  /84   Pulse 90   Resp 16   Ht 5' 9\" (1.753 m)   Wt 196 lb (88.9 kg)   SpO2 98%   BMI 28.94 kg/m²     Exam

## 2020-12-08 NOTE — PROGRESS NOTES
Bret Sidhu is a 44year old male.     HPI:     HPI     Diabetic Eye Exam      Additional comments: Pt has been a diabetic for 8 years       Pt's diabetes is currently controlled by pills  Pt checks BS 2 times a day   Pt's last blood sugar was  101 Types: Cigarettes        Quit date: 1/1/2005        Years since quitting: 15.9      Smokeless tobacco: Never Used    Alcohol use: Yes      Comment: Occasional    Drug use: No      Medications:  Current Outpatient Medications   Medication Sig Dispense Refil quiet Deep and quiet    Iris Normal Normal    Lens Clear Clear    Vitreous Clear Clear          Fundus Exam       Right Left    Disc Sloping margin, Temporal crescent Sloping margin, Temporal crescent    C/D Ratio 0.45 0.45    Macula Normal- no BDR Normal-

## 2020-12-08 NOTE — PATIENT INSTRUCTIONS
Diabetes mellitus type 2 without retinopathy ([facility] [address]  Diabetes type II: no background of retinopathy, no signs of neovascularization noted. Discussed ocular and systemic benefits of blood sugar control.   Diagnosis and treatment discussed in detail with juan

## 2020-12-21 ENCOUNTER — TELEMEDICINE (OUTPATIENT)
Dept: ENDOCRINOLOGY CLINIC | Facility: CLINIC | Age: 50
End: 2020-12-21
Payer: COMMERCIAL

## 2020-12-21 DIAGNOSIS — E11.9 CONTROLLED TYPE 2 DIABETES MELLITUS WITHOUT COMPLICATION, WITHOUT LONG-TERM CURRENT USE OF INSULIN (HCC): Primary | ICD-10-CM

## 2020-12-21 PROCEDURE — 99213 OFFICE O/P EST LOW 20 MIN: CPT | Performed by: INTERNAL MEDICINE

## 2020-12-21 NOTE — PROGRESS NOTES
Name: Naz Sidhu  Date: 12/21/2020    Referring Physician: No ref. provider found    HISTORY OF PRESENT ILLNESS   Naz Sidhu is a 44year old male who presents for diabetes mellitus, diagnosed approximately 5 years ago.      Prior HbA, C Rfl: 3  •  Omega-3 Fatty Acids (FISH OIL) 1200 MG Oral Cap, Take 1 capsule by mouth daily. , Disp: , Rfl:   •  aspirin 81 MG Oral Tab, Take 81 mg by mouth daily. , Disp: , Rfl:      Allergies:     Penicillins             SWELLING    Social History:   Social excessive bruising  Psychiatric:  oriented to time, self, and place    ASSESSMENT/PLAN:      1.  Diabetes Mellitus Type 2, controlled  -controlled  -Congratulated patient on well controlled levels   -Discussed importance of glycemic control to prevent compl

## 2021-01-29 DIAGNOSIS — E11.65 UNCONTROLLED TYPE 2 DIABETES MELLITUS WITH HYPERGLYCEMIA (HCC): ICD-10-CM

## 2021-01-30 DIAGNOSIS — E11.65 UNCONTROLLED TYPE 2 DIABETES MELLITUS WITH HYPERGLYCEMIA (HCC): ICD-10-CM

## 2021-04-22 DIAGNOSIS — E11.65 UNCONTROLLED TYPE 2 DIABETES MELLITUS WITH HYPERGLYCEMIA (HCC): ICD-10-CM

## 2021-04-22 DIAGNOSIS — E78.5 HYPERLIPIDEMIA, UNSPECIFIED HYPERLIPIDEMIA TYPE: ICD-10-CM

## 2021-04-23 RX ORDER — ATORVASTATIN CALCIUM 10 MG/1
TABLET, FILM COATED ORAL
Qty: 90 TABLET | Refills: 3 | Status: SHIPPED | OUTPATIENT
Start: 2021-04-23 | End: 2022-01-16

## 2021-05-10 ENCOUNTER — OFFICE VISIT (OUTPATIENT)
Dept: FAMILY MEDICINE CLINIC | Facility: CLINIC | Age: 51
End: 2021-05-10
Payer: COMMERCIAL

## 2021-05-10 VITALS
HEART RATE: 88 BPM | WEIGHT: 195 LBS | BODY MASS INDEX: 28.88 KG/M2 | HEIGHT: 69 IN | DIASTOLIC BLOOD PRESSURE: 88 MMHG | OXYGEN SATURATION: 98 % | RESPIRATION RATE: 16 BRPM | SYSTOLIC BLOOD PRESSURE: 132 MMHG

## 2021-05-10 DIAGNOSIS — E11.9 TYPE 2 DIABETES MELLITUS WITHOUT COMPLICATION, WITHOUT LONG-TERM CURRENT USE OF INSULIN (HCC): ICD-10-CM

## 2021-05-10 DIAGNOSIS — Z12.5 PROSTATE CANCER SCREENING: ICD-10-CM

## 2021-05-10 DIAGNOSIS — Z00.00 WELLNESS EXAMINATION: Primary | ICD-10-CM

## 2021-05-10 PROCEDURE — 3008F BODY MASS INDEX DOCD: CPT | Performed by: FAMILY MEDICINE

## 2021-05-10 PROCEDURE — 99396 PREV VISIT EST AGE 40-64: CPT | Performed by: FAMILY MEDICINE

## 2021-05-10 PROCEDURE — 3079F DIAST BP 80-89 MM HG: CPT | Performed by: FAMILY MEDICINE

## 2021-05-10 PROCEDURE — 3075F SYST BP GE 130 - 139MM HG: CPT | Performed by: FAMILY MEDICINE

## 2021-05-10 RX ORDER — FLASH GLUCOSE SCANNING READER
1 EACH MISCELLANEOUS DAILY
Qty: 1 DEVICE | Refills: 0 | Status: SHIPPED | OUTPATIENT
Start: 2021-05-10

## 2021-05-10 RX ORDER — FLASH GLUCOSE SENSOR
1 KIT MISCELLANEOUS
Qty: 6 EACH | Refills: 1 | Status: SHIPPED | OUTPATIENT
Start: 2021-05-10

## 2021-05-10 NOTE — PROGRESS NOTES
HPI:   Amairani Sidhu is a 44year old male who presents for an Annual Health Visit. Got Covid in January, had fever for 12 to 13 days. Daughter got it, but now has vaccine. Has still had tinnitus which started with the infection.     No m 1994    ACL repair   • OTHER  1992    Deviated septum   • TONSILLECTOMY  1995      Family History   Problem Relation Age of Onset   • Diabetes Father    • Heart Disease Father         MI age 45   • Cancer Father 67        lung cancer   • Other (Lung Cancer external ear canals both ears  Nose: Nares normal. Septum midline. Mucosa normal. No drainage or sinus tenderness.   Throat: lips, mucosa, and tongue normal; teeth and gums normal  Neck: no adenopathy, no carotid bruit, no JVD, supple, symmetrical, trachea

## 2021-06-17 ENCOUNTER — HOSPITAL ENCOUNTER (OUTPATIENT)
Age: 51
Discharge: HOME | End: 2021-06-17
Payer: COMMERCIAL

## 2021-06-17 VITALS
WEIGHT: 190 LBS | BODY MASS INDEX: 27.2 KG/M2 | HEIGHT: 70 IN | DIASTOLIC BLOOD PRESSURE: 88 MMHG | HEART RATE: 89 BPM | RESPIRATION RATE: 20 BRPM | TEMPERATURE: 98 F | SYSTOLIC BLOOD PRESSURE: 138 MMHG | OXYGEN SATURATION: 98 %

## 2021-06-17 DIAGNOSIS — M54.12 CERVICAL RADICULOPATHY: Primary | ICD-10-CM

## 2021-06-17 PROCEDURE — 99214 OFFICE O/P EST MOD 30 MIN: CPT | Performed by: NURSE PRACTITIONER

## 2021-06-17 RX ORDER — HYDROCODONE BITARTRATE AND ACETAMINOPHEN 5; 325 MG/1; MG/1
1 TABLET ORAL EVERY 6 HOURS PRN
Qty: 10 TABLET | Refills: 0 | Status: SHIPPED | OUTPATIENT
Start: 2021-06-17 | End: 2021-06-17

## 2021-06-17 RX ORDER — HYDROCODONE BITARTRATE AND ACETAMINOPHEN 5; 325 MG/1; MG/1
1 TABLET ORAL EVERY 6 HOURS PRN
Qty: 10 TABLET | Refills: 0 | Status: SHIPPED | OUTPATIENT
Start: 2021-06-17

## 2021-06-17 RX ORDER — CYCLOBENZAPRINE HCL 10 MG
10 TABLET ORAL 3 TIMES DAILY PRN
Qty: 20 TABLET | Refills: 0 | Status: SHIPPED | OUTPATIENT
Start: 2021-06-17 | End: 2021-06-17

## 2021-06-17 RX ORDER — PREDNISONE 20 MG/1
40 TABLET ORAL DAILY
Qty: 10 TABLET | Refills: 0 | Status: SHIPPED | OUTPATIENT
Start: 2021-06-17 | End: 2021-06-17

## 2021-06-17 RX ORDER — PREDNISONE 20 MG/1
40 TABLET ORAL DAILY
Qty: 10 TABLET | Refills: 0 | Status: SHIPPED | OUTPATIENT
Start: 2021-06-17 | End: 2021-06-22

## 2021-06-17 RX ORDER — CYCLOBENZAPRINE HCL 10 MG
10 TABLET ORAL 3 TIMES DAILY PRN
Qty: 20 TABLET | Refills: 0 | Status: SHIPPED | OUTPATIENT
Start: 2021-06-17 | End: 2021-06-24

## 2021-06-17 NOTE — ED PROVIDER NOTES
Patient Seen in: Immediate [facility]      History   Patient presents with:  Shoulder Pain: Major shoulder pain in right shoulder woke up this morning with a burning pain all the way down to my wrist - Entered by patient    Stated Complaint: Strain, Min Cigarettes        Quit date: 2005        Years since quittin.4      Smokeless tobacco: Never Used    Vaping Use      Vaping Use: Never used    Alcohol use: Yes      Comment: Occasional    Drug use:  No             Review of Systems    Positive for display                MDM     Patient resents today with pain to the right trapezius radiating down to the shoulder and hand. Did have some numbness tingling to the fourth and fifth finger consistent with radiculopathy. No known injuries.   Awoke with pa

## 2021-06-17 NOTE — ED INITIAL ASSESSMENT (HPI)
Woke this am with pain in right upper back with radiation and burning into arm and into wrist and tingling to fingers. Taking aleve and put ice to the area.

## 2021-06-18 ENCOUNTER — APPOINTMENT (OUTPATIENT)
Dept: GENERAL RADIOLOGY | Facility: HOSPITAL | Age: 51
End: 2021-06-18
Attending: EMERGENCY MEDICINE
Payer: COMMERCIAL

## 2021-06-18 ENCOUNTER — HOSPITAL ENCOUNTER (EMERGENCY)
Facility: HOSPITAL | Age: 51
Discharge: HOME | End: 2021-06-18
Attending: EMERGENCY MEDICINE
Payer: COMMERCIAL

## 2021-06-18 VITALS
SYSTOLIC BLOOD PRESSURE: 142 MMHG | OXYGEN SATURATION: 95 % | WEIGHT: 190 LBS | TEMPERATURE: 98 F | HEIGHT: 70 IN | RESPIRATION RATE: 16 BRPM | DIASTOLIC BLOOD PRESSURE: 95 MMHG | BODY MASS INDEX: 27.2 KG/M2 | HEART RATE: 72 BPM

## 2021-06-18 DIAGNOSIS — M25.511 ACUTE PAIN OF RIGHT SHOULDER: Primary | ICD-10-CM

## 2021-06-18 PROCEDURE — 99284 EMERGENCY DEPT VISIT MOD MDM: CPT

## 2021-06-18 PROCEDURE — 73030 X-RAY EXAM OF SHOULDER: CPT | Performed by: EMERGENCY MEDICINE

## 2021-06-18 PROCEDURE — 72050 X-RAY EXAM NECK SPINE 4/5VWS: CPT | Performed by: EMERGENCY MEDICINE

## 2021-06-18 RX ORDER — HYDROCODONE BITARTRATE AND ACETAMINOPHEN 7.5; 325 MG/1; MG/1
1-2 TABLET ORAL EVERY 6 HOURS PRN
Qty: 10 TABLET | Refills: 0 | Status: SHIPPED | OUTPATIENT
Start: 2021-06-18 | End: 2021-06-19

## 2021-06-18 NOTE — ED PROVIDER NOTES
Patient Seen in: [facility] Emergency Department      History   Patient presents with:  Arm or Hand Injury    Stated Complaint: R ARM PAIN    HPI/Subjective:   HPI    49-year-old male with past medical history of diabetes presents today with right s HPI.  Constitutional and vital signs reviewed. All other systems reviewed and negative except as noted above.     Physical Exam     ED Triage Vitals [06/18/21 0456]   /82   Pulse 79   Resp 16   Temp 97.6 °F (36.4 °C)   Temp src Temporal   SpO2 97 lesion. DISC SPACES:  Normal.  No significant disc height narrowing, subluxation, or endplate abnormality. PARASPINOUS:  Negative. No paraspinous abnormality is seen. OTHER:  Negative.               CONCLUSION:  Radiographs of the cervical spine are within shoulder. Upon massaging the area, patient reported improvement to his discomfort. He now offers that prior to his pain starting, patient was pulled by his large dog.   I discussed possibilities of the shoulder discomfort, including rotator cuff versus mu

## 2021-06-18 NOTE — ED NOTES
Pt called, [facility] stating [place] prescription cancelled. This RN contacted Pharmacy- no cancellation noted in patient chart. States they received call that it was sent to a different pharmacy and to cancel.  Confirmed with Dr Dangelo Major

## 2021-06-19 RX ORDER — HYDROCODONE BITARTRATE AND ACETAMINOPHEN 7.5; 325 MG/1; MG/1
1-2 TABLET ORAL EVERY 6 HOURS PRN
Qty: 10 TABLET | Refills: 0 | Status: SHIPPED | OUTPATIENT
Start: 2021-06-19 | End: 2021-06-21

## 2021-06-21 ENCOUNTER — OFFICE VISIT (OUTPATIENT)
Dept: FAMILY MEDICINE CLINIC | Facility: CLINIC | Age: 51
End: 2021-06-21
Payer: COMMERCIAL

## 2021-06-21 VITALS
SYSTOLIC BLOOD PRESSURE: 134 MMHG | DIASTOLIC BLOOD PRESSURE: 72 MMHG | OXYGEN SATURATION: 96 % | HEART RATE: 90 BPM | HEIGHT: 70 IN | BODY MASS INDEX: 28.35 KG/M2 | RESPIRATION RATE: 16 BRPM | WEIGHT: 198 LBS

## 2021-06-21 DIAGNOSIS — M54.12 CERVICAL RADICULOPATHY: Primary | ICD-10-CM

## 2021-06-21 DIAGNOSIS — M79.601 RIGHT ARM PAIN: ICD-10-CM

## 2021-06-21 PROCEDURE — 3008F BODY MASS INDEX DOCD: CPT | Performed by: FAMILY MEDICINE

## 2021-06-21 PROCEDURE — 3078F DIAST BP <80 MM HG: CPT | Performed by: FAMILY MEDICINE

## 2021-06-21 PROCEDURE — 3075F SYST BP GE 130 - 139MM HG: CPT | Performed by: FAMILY MEDICINE

## 2021-06-21 PROCEDURE — 99214 OFFICE O/P EST MOD 30 MIN: CPT | Performed by: FAMILY MEDICINE

## 2021-06-21 RX ORDER — MELOXICAM 15 MG/1
15 TABLET ORAL DAILY
Qty: 14 TABLET | Refills: 0 | Status: SHIPPED | OUTPATIENT
Start: 2021-06-21

## 2021-06-21 RX ORDER — HYDROCODONE BITARTRATE AND ACETAMINOPHEN 7.5; 325 MG/1; MG/1
1-2 TABLET ORAL EVERY 6 HOURS PRN
Qty: 21 TABLET | Refills: 0 | Status: SHIPPED | OUTPATIENT
Start: 2021-06-21 | End: 2021-06-28

## 2021-06-21 NOTE — PROGRESS NOTES
[facility] Progress Note    SUBJECTIVE: Marcelo Castillo Thea 44year old male is here today for Patient presents with:  Shoulder Pain: Rt shoulder, Started Wednesday seen in Er on Friday. Pain shoot down to the hand.  There is numbness in his fing Outpatient Medications   Medication Sig Dispense Refill   • HYDROcodone-acetaminophen 7.5-325 MG Oral Tab Take 1-2 tablets by mouth every 6 (six) hours as needed for Pain. 21 tablet 0   • Meloxicam 15 MG Oral Tab Take 1 tablet (15 mg total) by mouth daily. Oral Tab; Take 1-2 tablets by mouth every 6 (six) hours as needed for Pain.  -     Meloxicam 15 MG Oral Tab; Take 1 tablet (15 mg total) by mouth daily. Right arm pain         The location of his pain fits C5-C6     Suspect C5 to C6 is area.     Appears

## 2021-06-24 ENCOUNTER — HOSPITAL ENCOUNTER (OUTPATIENT)
Dept: MRI IMAGING | Facility: HOSPITAL | Age: 51
Discharge: HOME | End: 2021-06-24
Attending: FAMILY MEDICINE
Payer: COMMERCIAL

## 2021-06-24 DIAGNOSIS — M54.12 CERVICAL RADICULOPATHY: ICD-10-CM

## 2021-06-24 PROCEDURE — 72141 MRI NECK SPINE W/O DYE: CPT | Performed by: FAMILY MEDICINE

## 2021-09-29 ENCOUNTER — OFFICE VISIT (OUTPATIENT)
Dept: ENDOCRINOLOGY CLINIC | Facility: CLINIC | Age: 51
End: 2021-09-29
Payer: COMMERCIAL

## 2021-09-29 VITALS
SYSTOLIC BLOOD PRESSURE: 132 MMHG | WEIGHT: 193.38 LBS | DIASTOLIC BLOOD PRESSURE: 97 MMHG | BODY MASS INDEX: 28 KG/M2 | HEART RATE: 101 BPM

## 2021-09-29 DIAGNOSIS — E11.65 UNCONTROLLED TYPE 2 DIABETES MELLITUS WITH HYPERGLYCEMIA (HCC): Primary | ICD-10-CM

## 2021-09-29 LAB
CARTRIDGE LOT#: ABNORMAL NUMERIC
GLUCOSE BLOOD: 333
HEMOGLOBIN A1C: 7.9 % (ref 4.3–5.6)
TEST STRIP LOT #: NORMAL NUMERIC

## 2021-09-29 PROCEDURE — 83036 HEMOGLOBIN GLYCOSYLATED A1C: CPT | Performed by: INTERNAL MEDICINE

## 2021-09-29 PROCEDURE — 3080F DIAST BP >= 90 MM HG: CPT | Performed by: INTERNAL MEDICINE

## 2021-09-29 PROCEDURE — 3075F SYST BP GE 130 - 139MM HG: CPT | Performed by: INTERNAL MEDICINE

## 2021-09-29 PROCEDURE — 99213 OFFICE O/P EST LOW 20 MIN: CPT | Performed by: INTERNAL MEDICINE

## 2021-09-29 PROCEDURE — 82947 ASSAY GLUCOSE BLOOD QUANT: CPT | Performed by: INTERNAL MEDICINE

## 2021-09-29 PROCEDURE — 36416 COLLJ CAPILLARY BLOOD SPEC: CPT | Performed by: INTERNAL MEDICINE

## 2021-09-29 NOTE — PROGRESS NOTES
Name: Marcelo Sidhu  Date: 9/29/2021    Referring Physician: No ref. provider found    HISTORY OF PRESENT ILLNESS   Marcelo Sidhu is a 46year old male who presents for diabetes mellitus, diagnosed approximately 5 years ago.      Since last vi daily, Disp: 180 tablet, Rfl: 1  •  dapagliflozin Propanediol (FARXIGA) 10 MG Oral Tab, TAKE 1 TABLET BY MOUTH ONE TIME A DAY, Disp: 90 tablet, Rfl: 1  •  Multiple Vitamin (MULTI-DAY) Oral Tab, Take by mouth., Disp: , Rfl:   •  ONETOUCH DELICA LANCETS 43U • COLONOSCOPY N/A 11/30/2018    Procedure: COLONOSCOPY;  Surgeon: Delta Miller MD;  Location: [address] [place] [address] ENDOSCOPY   • KNEE ARTHROSCOPY Right 1996   • KNEE ARTHROSCOPY Right 1998   • KNEE ARTHROSCOPY Left 2010   • KNEE SURGERY Right 1994    ACL repair

## 2021-12-09 ENCOUNTER — OFFICE VISIT (OUTPATIENT)
Dept: OPHTHALMOLOGY | Facility: CLINIC | Age: 51
End: 2021-12-09
Payer: COMMERCIAL

## 2021-12-09 DIAGNOSIS — E11.9 DIABETES MELLITUS TYPE 2 WITHOUT RETINOPATHY (HCC): Primary | ICD-10-CM

## 2021-12-09 PROCEDURE — 92014 COMPRE OPH EXAM EST PT 1/>: CPT | Performed by: OPHTHALMOLOGY

## 2021-12-09 NOTE — PROGRESS NOTES
Mini Sidhu is a 46year old male.     HPI:     HPI     Diabetic Eye Exam     Comments: Pt has been a diabetic for 9 years       Pt's diabetes is currently controlled by pills   Pt checks BS a few times a week   Pt's last blood sugar was 108 on las 15.00        Pack years: 7.5        Types: Cigarettes        Quit date: 2005        Years since quittin.9      Smokeless tobacco: Never Used    Vaping Use      Vaping Use: Never used    Alcohol use: Yes      Comment: Occasional    Drug use:  No Glenn Allen on 12/9/2021  1:10 PM. (History)          PHYSICAL EXAM:     Base Eye Exam     Visual Acuity (Snellen - Linear)       Right Left    Dist cc 20/20 -2 20/25    Near cc 20/20 20/20    Correction: Contacts          Tonometry ([facility]care, 1:20 PM)       Rig exam.    12/9/2021  Scribed by: Rolf Enciso MD

## 2021-12-09 NOTE — PATIENT INSTRUCTIONS
Diabetes mellitus type 2 without retinopathy ([facility] [address]  Diabetes type II: no background of retinopathy, no signs of neovascularization noted. Discussed ocular and systemic benefits of blood sugar control.   Diagnosis and treatment discussed in detail with juan

## 2022-01-16 DIAGNOSIS — E11.65 UNCONTROLLED TYPE 2 DIABETES MELLITUS WITH HYPERGLYCEMIA (HCC): ICD-10-CM

## 2022-01-16 DIAGNOSIS — E78.5 HYPERLIPIDEMIA, UNSPECIFIED HYPERLIPIDEMIA TYPE: ICD-10-CM

## 2022-01-17 RX ORDER — ATORVASTATIN CALCIUM 10 MG/1
TABLET, FILM COATED ORAL
Qty: 90 TABLET | Refills: 3 | Status: SHIPPED | OUTPATIENT
Start: 2022-01-17

## 2022-01-17 NOTE — TELEPHONE ENCOUNTER
Last ov 6/21/2021    Last refill 4/23/2021    .   Cholesterol:     Lab Results   Component Value Date    CHOLEST 132 02/07/2020    CHOLEST 139 11/20/2018    CHOLEST 141 12/13/2017     Lab Results   Component Value Date    HDL 37 (L) 02/07/2020    HDL 36 11/

## 2022-06-01 ENCOUNTER — OFFICE VISIT (OUTPATIENT)
Dept: ENDOCRINOLOGY CLINIC | Facility: CLINIC | Age: 52
End: 2022-06-01
Payer: COMMERCIAL

## 2022-06-01 VITALS
SYSTOLIC BLOOD PRESSURE: 133 MMHG | BODY MASS INDEX: 28 KG/M2 | WEIGHT: 194.38 LBS | HEART RATE: 82 BPM | DIASTOLIC BLOOD PRESSURE: 87 MMHG

## 2022-06-01 DIAGNOSIS — E11.65 UNCONTROLLED TYPE 2 DIABETES MELLITUS WITH HYPERGLYCEMIA (HCC): Primary | ICD-10-CM

## 2022-06-01 LAB
CARTRIDGE LOT#: ABNORMAL NUMERIC
GLUCOSE BLOOD: 238
HEMOGLOBIN A1C: 7.8 % (ref 4.3–5.6)
TEST STRIP LOT #: NORMAL NUMERIC

## 2022-06-01 PROCEDURE — 3079F DIAST BP 80-89 MM HG: CPT | Performed by: INTERNAL MEDICINE

## 2022-06-01 PROCEDURE — 36416 COLLJ CAPILLARY BLOOD SPEC: CPT | Performed by: INTERNAL MEDICINE

## 2022-06-01 PROCEDURE — 82947 ASSAY GLUCOSE BLOOD QUANT: CPT | Performed by: INTERNAL MEDICINE

## 2022-06-01 PROCEDURE — 3051F HG A1C>EQUAL 7.0%<8.0%: CPT | Performed by: INTERNAL MEDICINE

## 2022-06-01 PROCEDURE — 99214 OFFICE O/P EST MOD 30 MIN: CPT | Performed by: INTERNAL MEDICINE

## 2022-06-01 PROCEDURE — 83036 HEMOGLOBIN GLYCOSYLATED A1C: CPT | Performed by: INTERNAL MEDICINE

## 2022-06-01 PROCEDURE — 3075F SYST BP GE 130 - 139MM HG: CPT | Performed by: INTERNAL MEDICINE

## 2022-06-01 RX ORDER — SEMAGLUTIDE 1.34 MG/ML
0.5 INJECTION, SOLUTION SUBCUTANEOUS WEEKLY
Qty: 4.5 ML | Refills: 1 | Status: SHIPPED | OUTPATIENT
Start: 2022-06-01

## 2022-06-01 NOTE — PATIENT INSTRUCTIONS
START Ozempic 0.25mg subcutaneous weekly for one month then increase to 0.5mg subcutaneous weekly
To get better and follow your care plan as instructed.

## 2022-07-11 DIAGNOSIS — E11.65 UNCONTROLLED TYPE 2 DIABETES MELLITUS WITH HYPERGLYCEMIA (HCC): ICD-10-CM

## 2022-07-22 DIAGNOSIS — E11.65 UNCONTROLLED TYPE 2 DIABETES MELLITUS WITH HYPERGLYCEMIA (HCC): ICD-10-CM

## 2022-07-23 RX ORDER — DAPAGLIFLOZIN 10 MG/1
TABLET, FILM COATED ORAL
Qty: 90 TABLET | Refills: 0 | Status: SHIPPED | OUTPATIENT
Start: 2022-07-23

## 2022-10-05 ENCOUNTER — OFFICE VISIT (OUTPATIENT)
Dept: ENDOCRINOLOGY CLINIC | Facility: CLINIC | Age: 52
End: 2022-10-05
Payer: COMMERCIAL

## 2022-10-05 VITALS
BODY MASS INDEX: 27 KG/M2 | WEIGHT: 188 LBS | DIASTOLIC BLOOD PRESSURE: 77 MMHG | SYSTOLIC BLOOD PRESSURE: 127 MMHG | HEART RATE: 91 BPM

## 2022-10-05 DIAGNOSIS — E11.65 UNCONTROLLED TYPE 2 DIABETES MELLITUS WITH HYPERGLYCEMIA (HCC): Primary | ICD-10-CM

## 2022-10-05 DIAGNOSIS — I10 HYPERTENSION, UNSPECIFIED TYPE: ICD-10-CM

## 2022-10-05 DIAGNOSIS — E78.5 HYPERLIPIDEMIA, UNSPECIFIED HYPERLIPIDEMIA TYPE: ICD-10-CM

## 2022-10-05 LAB
CARTRIDGE LOT#: NORMAL NUMERIC
GLUCOSE BLOOD: 176
HEMOGLOBIN A1C: 5 % (ref 4.3–5.6)
TEST STRIP LOT #: NORMAL NUMERIC

## 2022-10-05 PROCEDURE — 82947 ASSAY GLUCOSE BLOOD QUANT: CPT | Performed by: INTERNAL MEDICINE

## 2022-10-05 PROCEDURE — 3044F HG A1C LEVEL LT 7.0%: CPT | Performed by: INTERNAL MEDICINE

## 2022-10-05 PROCEDURE — 3074F SYST BP LT 130 MM HG: CPT | Performed by: INTERNAL MEDICINE

## 2022-10-05 PROCEDURE — 83036 HEMOGLOBIN GLYCOSYLATED A1C: CPT | Performed by: INTERNAL MEDICINE

## 2022-10-05 PROCEDURE — 99214 OFFICE O/P EST MOD 30 MIN: CPT | Performed by: INTERNAL MEDICINE

## 2022-10-05 PROCEDURE — 3078F DIAST BP <80 MM HG: CPT | Performed by: INTERNAL MEDICINE

## 2022-10-05 RX ORDER — ATORVASTATIN CALCIUM 10 MG/1
TABLET, FILM COATED ORAL
Qty: 90 TABLET | Refills: 3 | Status: SHIPPED | OUTPATIENT
Start: 2022-10-05

## 2022-10-05 RX ORDER — SEMAGLUTIDE 1.34 MG/ML
0.5 INJECTION, SOLUTION SUBCUTANEOUS WEEKLY
Qty: 4.5 ML | Refills: 1 | Status: SHIPPED | OUTPATIENT
Start: 2022-10-05

## 2022-12-13 ENCOUNTER — TELEPHONE (OUTPATIENT)
Dept: OPHTHALMOLOGY | Facility: CLINIC | Age: 52
End: 2022-12-13

## 2023-02-22 RX ORDER — SEMAGLUTIDE 1.34 MG/ML
0.5 INJECTION, SOLUTION SUBCUTANEOUS WEEKLY
Qty: 4.5 ML | Refills: 0 | Status: SHIPPED | OUTPATIENT
Start: 2023-02-22

## 2023-02-22 NOTE — TELEPHONE ENCOUNTER
LOV: 10/05/22    Future Appointments   Date Time Provider Tam Burris   4/12/2023 11:00 AM Lisa Thapa MD [facility] ADO     Refilled per protocol.

## 2023-02-23 ENCOUNTER — TELEPHONE (OUTPATIENT)
Dept: NEUROSURGERY | Age: 53
End: 2023-02-23

## 2023-02-23 DIAGNOSIS — M54.12 CERVICAL RADICULOPATHY: Primary | ICD-10-CM

## 2023-02-27 ENCOUNTER — APPOINTMENT (OUTPATIENT)
Dept: NEUROSURGERY | Age: 53
End: 2023-02-27

## 2023-03-03 RX ORDER — SEMAGLUTIDE 1.34 MG/ML
0.5 INJECTION, SOLUTION SUBCUTANEOUS WEEKLY
Qty: 4.5 ML | Refills: 0 | Status: SHIPPED | OUTPATIENT
Start: 2023-03-03

## 2023-03-03 NOTE — TELEPHONE ENCOUNTER
LOV; 10/05/22    RTC;6months    F/U;04/12/23     Pending Monthly Supply;order pending, approve if appropriate.

## 2023-03-08 ENCOUNTER — TELEPHONE (OUTPATIENT)
Dept: ENDOCRINOLOGY CLINIC | Facility: CLINIC | Age: 53
End: 2023-03-08

## 2023-03-08 NOTE — TELEPHONE ENCOUNTER
Received fax from \"[employer]\" attached is a prior authorization request for Ozempic 0.5 mg. Sent to scanning. Medication  CGM  pump supply Requested:                    Ozempic 0.5 mg 2mg/1.5mL subcutaneous solution pen injector                                        CoverMyMeds Used: n/a    Key: n/a    Sig: inject 0.5 mg into the skin once a week    DX Code: E11.65                                       Case Number/Pending Ref#:    Routing to PA Department for further assistance. Thank you!

## 2023-03-13 NOTE — TELEPHONE ENCOUNTER
Medication PA Requested:      semaglutide (OZEMPIC, 0.25 OR 0.5 MG/DOSE,) 2 MG/1.5ML Subcutaneous Solution Pen-injector                                                    CoverMyMeds Used:  Key:  Quantity: 4.5 ml  Day Supply:  Sig: Inject 0.5 mg into the skin once a week. DX Code:  E11.65                                   CPT code (if applicable):   Case Number/Pending Ref#:      Called [facility] at [phone], spoke with [facility]. PA form will be faxed for completion, [address] [place] [address] Street [place] [id number].  Form received and completed, faxed to [facility] with LOV 10/1/22, A1c 6/1/22 and 10/5/22    Awaiting determination

## 2023-03-20 NOTE — TELEPHONE ENCOUNTER
Called [facility] at [phone], spoke with Armando Ratliff, all requested information has been received, PA is pending clinical review.

## 2023-03-20 NOTE — TELEPHONE ENCOUNTER
Dr. Janay Feliz,     Please advise. Received PA.  Filled out, printed LOV notes, and placed in folder for signature and date     Fax to [phone]

## 2023-03-24 NOTE — TELEPHONE ENCOUNTER
Gayatri phillips sent approval notice       Auth start 03/23/23 to 03/22/24    Contacted pt, NO answer. Left message with information. Advised to call our office if he has any questions. Documents sent to scanning.

## 2023-03-27 RX ORDER — SEMAGLUTIDE 1.34 MG/ML
0.5 INJECTION, SOLUTION SUBCUTANEOUS WEEKLY
Qty: 9 ML | Refills: 0 | Status: SHIPPED | OUTPATIENT
Start: 2023-03-27

## 2023-04-26 ENCOUNTER — OFFICE VISIT (OUTPATIENT)
Dept: FAMILY MEDICINE CLINIC | Facility: CLINIC | Age: 53
End: 2023-04-26
Payer: COMMERCIAL

## 2023-04-26 VITALS
BODY MASS INDEX: 26.92 KG/M2 | SYSTOLIC BLOOD PRESSURE: 110 MMHG | OXYGEN SATURATION: 97 % | DIASTOLIC BLOOD PRESSURE: 66 MMHG | WEIGHT: 188 LBS | RESPIRATION RATE: 16 BRPM | HEART RATE: 68 BPM | HEIGHT: 70 IN

## 2023-04-26 DIAGNOSIS — M65.322 TRIGGER INDEX FINGER OF LEFT HAND: ICD-10-CM

## 2023-04-26 DIAGNOSIS — Z13.0 SCREENING FOR DEFICIENCY ANEMIA: ICD-10-CM

## 2023-04-26 DIAGNOSIS — E78.5 DYSLIPIDEMIA ASSOCIATED WITH TYPE 2 DIABETES MELLITUS (HCC): ICD-10-CM

## 2023-04-26 DIAGNOSIS — Z00.00 WELLNESS EXAMINATION: Primary | ICD-10-CM

## 2023-04-26 DIAGNOSIS — E11.9 DIABETES MELLITUS TYPE 2 WITHOUT RETINOPATHY (HCC): ICD-10-CM

## 2023-04-26 DIAGNOSIS — Z12.5 PROSTATE CANCER SCREENING: ICD-10-CM

## 2023-04-26 DIAGNOSIS — E11.69 DYSLIPIDEMIA ASSOCIATED WITH TYPE 2 DIABETES MELLITUS (HCC): ICD-10-CM

## 2023-04-26 DIAGNOSIS — Z13.220 LIPID SCREENING: ICD-10-CM

## 2023-04-26 DIAGNOSIS — M79.89 SWOLLEN FINGER: ICD-10-CM

## 2023-04-26 DIAGNOSIS — R73.09 ELEVATED GLUCOSE: ICD-10-CM

## 2023-04-26 PROCEDURE — 3078F DIAST BP <80 MM HG: CPT | Performed by: FAMILY MEDICINE

## 2023-04-26 PROCEDURE — 99396 PREV VISIT EST AGE 40-64: CPT | Performed by: FAMILY MEDICINE

## 2023-04-26 PROCEDURE — 3008F BODY MASS INDEX DOCD: CPT | Performed by: FAMILY MEDICINE

## 2023-04-26 PROCEDURE — 3074F SYST BP LT 130 MM HG: CPT | Performed by: FAMILY MEDICINE

## 2023-06-08 DIAGNOSIS — E78.5 HYPERLIPIDEMIA, UNSPECIFIED HYPERLIPIDEMIA TYPE: ICD-10-CM

## 2023-06-08 DIAGNOSIS — E11.65 UNCONTROLLED TYPE 2 DIABETES MELLITUS WITH HYPERGLYCEMIA (HCC): ICD-10-CM

## 2023-06-08 RX ORDER — ATORVASTATIN CALCIUM 10 MG/1
TABLET, FILM COATED ORAL
Qty: 90 TABLET | Refills: 3 | Status: SHIPPED | OUTPATIENT
Start: 2023-06-08

## 2023-06-08 RX ORDER — SEMAGLUTIDE 1.34 MG/ML
0.5 INJECTION, SOLUTION SUBCUTANEOUS WEEKLY
Qty: 9 ML | Refills: 0 | Status: CANCELLED | OUTPATIENT
Start: 2023-06-08

## 2023-06-25 DIAGNOSIS — E11.65 UNCONTROLLED TYPE 2 DIABETES MELLITUS WITH HYPERGLYCEMIA (HCC): ICD-10-CM

## 2023-08-06 DIAGNOSIS — E11.65 UNCONTROLLED TYPE 2 DIABETES MELLITUS WITH HYPERGLYCEMIA (HCC): ICD-10-CM

## 2023-08-06 DIAGNOSIS — E78.5 HYPERLIPIDEMIA, UNSPECIFIED HYPERLIPIDEMIA TYPE: ICD-10-CM

## 2023-08-07 RX ORDER — ATORVASTATIN CALCIUM 10 MG/1
TABLET, FILM COATED ORAL
Qty: 90 TABLET | Refills: 0 | Status: SHIPPED | OUTPATIENT
Start: 2023-08-07

## 2023-08-08 NOTE — TELEPHONE ENCOUNTER
This medication was already filled yesterday. Multiple requests received for this refill.   Request denied as duplicate request.

## 2023-09-14 ENCOUNTER — TELEPHONE (OUTPATIENT)
Facility: CLINIC | Age: 53
End: 2023-09-14

## 2023-09-20 ENCOUNTER — OFFICE VISIT (OUTPATIENT)
Dept: ENDOCRINOLOGY CLINIC | Facility: CLINIC | Age: 53
End: 2023-09-20

## 2023-09-20 VITALS
WEIGHT: 189 LBS | SYSTOLIC BLOOD PRESSURE: 116 MMHG | DIASTOLIC BLOOD PRESSURE: 78 MMHG | BODY MASS INDEX: 27 KG/M2 | HEART RATE: 86 BPM

## 2023-09-20 DIAGNOSIS — E11.9 CONTROLLED TYPE 2 DIABETES MELLITUS WITHOUT COMPLICATION, WITHOUT LONG-TERM CURRENT USE OF INSULIN (HCC): Primary | ICD-10-CM

## 2023-09-20 LAB
CARTRIDGE LOT#: ABNORMAL NUMERIC
GLUCOSE BLOOD: 167
HEMOGLOBIN A1C: 5.8 % (ref 4.3–5.6)
TEST STRIP LOT #: NORMAL NUMERIC

## 2023-09-20 PROCEDURE — 83036 HEMOGLOBIN GLYCOSYLATED A1C: CPT | Performed by: INTERNAL MEDICINE

## 2023-09-20 PROCEDURE — 82947 ASSAY GLUCOSE BLOOD QUANT: CPT | Performed by: INTERNAL MEDICINE

## 2023-09-20 PROCEDURE — 3074F SYST BP LT 130 MM HG: CPT | Performed by: INTERNAL MEDICINE

## 2023-09-20 PROCEDURE — 3044F HG A1C LEVEL LT 7.0%: CPT | Performed by: INTERNAL MEDICINE

## 2023-09-20 PROCEDURE — 3078F DIAST BP <80 MM HG: CPT | Performed by: INTERNAL MEDICINE

## 2023-09-20 PROCEDURE — 99214 OFFICE O/P EST MOD 30 MIN: CPT | Performed by: INTERNAL MEDICINE

## 2023-09-21 LAB
ABSOLUTE BASOPHILS: 38 CELLS/UL (ref 0–200)
ABSOLUTE EOSINOPHILS: 220 CELLS/UL (ref 15–500)
ABSOLUTE LYMPHOCYTES: 1330 CELLS/UL (ref 850–3900)
ABSOLUTE MONOCYTES: 699 CELLS/UL (ref 200–950)
ABSOLUTE NEUTROPHILS: 5312 CELLS/UL (ref 1500–7800)
ALBUMIN/GLOBULIN RATIO: 1.5 (CALC) (ref 1–2.5)
ALBUMIN: 4.7 G/DL (ref 3.6–5.1)
ALKALINE PHOSPHATASE: 67 U/L (ref 35–144)
ALT: 46 U/L (ref 9–46)
AST: 30 U/L (ref 10–35)
BASOPHILS: 0.5 %
BILIRUBIN, TOTAL: 1.9 MG/DL (ref 0.2–1.2)
BUN: 17 MG/DL (ref 7–25)
CALCIUM: 10.3 MG/DL (ref 8.6–10.3)
CARBON DIOXIDE: 29 MMOL/L (ref 20–32)
CHLORIDE: 102 MMOL/L (ref 98–110)
CHOL/HDLC RATIO: 3.1 (CALC)
CHOLESTEROL, TOTAL: 147 MG/DL
CREATININE, RANDOM URINE: 101 MG/DL (ref 20–320)
CREATININE: 0.92 MG/DL (ref 0.7–1.3)
EGFR: 98 ML/MIN/1.73M2
EOSINOPHILS: 2.9 %
GLOBULIN: 3.2 G/DL (CALC) (ref 1.9–3.7)
GLUCOSE: 151 MG/DL (ref 65–99)
HDL CHOLESTEROL: 47 MG/DL
HEMATOCRIT: 53.3 % (ref 38.5–50)
HEMOGLOBIN: 18.9 G/DL (ref 13.2–17.1)
LDL-CHOLESTEROL: 79 MG/DL (CALC)
LYMPHOCYTES: 17.5 %
MCH: 33 PG (ref 27–33)
MCHC: 35.5 G/DL (ref 32–36)
MCV: 93 FL (ref 80–100)
MICROALBUMIN/CREATININE RATIO, RANDOM URINE: 20 MCG/MG CREAT
MICROALBUMIN: 2 MG/DL
MONOCYTES: 9.2 %
MPV: 10.2 FL (ref 7.5–12.5)
NEUTROPHILS: 69.9 %
NON-HDL CHOLESTEROL: 100 MG/DL (CALC)
PLATELET COUNT: 187 THOUSAND/UL (ref 140–400)
POTASSIUM: 5.1 MMOL/L (ref 3.5–5.3)
PROTEIN, TOTAL: 7.9 G/DL (ref 6.1–8.1)
RDW: 13 % (ref 11–15)
RED BLOOD CELL COUNT: 5.73 MILLION/UL (ref 4.2–5.8)
SODIUM: 139 MMOL/L (ref 135–146)
TRIGLYCERIDES: 116 MG/DL
TSH: 1.58 MIU/L (ref 0.4–4.5)
WHITE BLOOD CELL COUNT: 7.6 THOUSAND/UL (ref 3.8–10.8)

## 2023-10-29 DIAGNOSIS — E11.65 UNCONTROLLED TYPE 2 DIABETES MELLITUS WITH HYPERGLYCEMIA (HCC): ICD-10-CM

## 2023-10-29 DIAGNOSIS — E78.5 HYPERLIPIDEMIA, UNSPECIFIED HYPERLIPIDEMIA TYPE: ICD-10-CM

## 2023-10-30 RX ORDER — ATORVASTATIN CALCIUM 10 MG/1
TABLET, FILM COATED ORAL
Qty: 90 TABLET | Refills: 0 | Status: SHIPPED | OUTPATIENT
Start: 2023-10-30

## 2024-01-03 DIAGNOSIS — E11.65 UNCONTROLLED TYPE 2 DIABETES MELLITUS WITH HYPERGLYCEMIA (HCC): ICD-10-CM

## 2024-01-29 DIAGNOSIS — E11.65 UNCONTROLLED TYPE 2 DIABETES MELLITUS WITH HYPERGLYCEMIA (HCC): ICD-10-CM

## 2024-01-30 DIAGNOSIS — E78.5 HYPERLIPIDEMIA, UNSPECIFIED HYPERLIPIDEMIA TYPE: ICD-10-CM

## 2024-01-31 RX ORDER — ATORVASTATIN CALCIUM 10 MG/1
TABLET, FILM COATED ORAL
Qty: 90 TABLET | Refills: 0 | Status: SHIPPED | OUTPATIENT
Start: 2024-01-31

## 2024-02-07 ENCOUNTER — TELEPHONE (OUTPATIENT)
Dept: ENDOCRINOLOGY CLINIC | Facility: CLINIC | Age: 54
End: 2024-02-07

## 2024-02-07 NOTE — TELEPHONE ENCOUNTER
Received PA form for Farxiga from [employer].     Completed PA form and faxed back to [phone] with LOV note and A1C.

## 2024-02-10 DIAGNOSIS — E78.5 HYPERLIPIDEMIA, UNSPECIFIED HYPERLIPIDEMIA TYPE: ICD-10-CM

## 2024-02-12 RX ORDER — ATORVASTATIN CALCIUM 10 MG/1
TABLET, FILM COATED ORAL
Qty: 90 TABLET | Refills: 0 | Status: SHIPPED | OUTPATIENT
Start: 2024-02-12

## 2024-02-21 NOTE — TELEPHONE ENCOUNTER
Received fax from [employer] in regards to Farxiga 10MG. Medication has been approved from 2/15/24 to 2/14/25. [employer] message sent to pt and approval letter sent to scanning.   ID # - [id number]

## 2024-03-20 ENCOUNTER — OFFICE VISIT (OUTPATIENT)
Dept: ENDOCRINOLOGY CLINIC | Facility: CLINIC | Age: 54
End: 2024-03-20
Payer: COMMERCIAL

## 2024-03-20 VITALS
BODY MASS INDEX: 27 KG/M2 | DIASTOLIC BLOOD PRESSURE: 70 MMHG | WEIGHT: 190 LBS | HEART RATE: 84 BPM | SYSTOLIC BLOOD PRESSURE: 103 MMHG

## 2024-03-20 DIAGNOSIS — E78.5 HYPERLIPIDEMIA, UNSPECIFIED HYPERLIPIDEMIA TYPE: ICD-10-CM

## 2024-03-20 DIAGNOSIS — E11.9 CONTROLLED TYPE 2 DIABETES MELLITUS WITHOUT COMPLICATION, WITHOUT LONG-TERM CURRENT USE OF INSULIN (HCC): Primary | ICD-10-CM

## 2024-03-20 LAB
CARTRIDGE LOT#: NORMAL NUMERIC
GLUCOSE BLOOD: 121
HEMOGLOBIN A1C: 5.6 % (ref 4.3–5.6)
TEST STRIP LOT #: NORMAL NUMERIC

## 2024-03-20 PROCEDURE — 82947 ASSAY GLUCOSE BLOOD QUANT: CPT | Performed by: INTERNAL MEDICINE

## 2024-03-20 PROCEDURE — 83036 HEMOGLOBIN GLYCOSYLATED A1C: CPT | Performed by: INTERNAL MEDICINE

## 2024-03-20 PROCEDURE — 99214 OFFICE O/P EST MOD 30 MIN: CPT | Performed by: INTERNAL MEDICINE

## 2024-03-20 RX ORDER — DAPAGLIFLOZIN 10 MG/1
10 TABLET, FILM COATED ORAL DAILY
Qty: 90 TABLET | Refills: 1 | Status: SHIPPED | OUTPATIENT
Start: 2024-03-20

## 2024-03-20 RX ORDER — ATORVASTATIN CALCIUM 10 MG/1
TABLET, FILM COATED ORAL
Qty: 90 TABLET | Refills: 1 | Status: SHIPPED | OUTPATIENT
Start: 2024-03-20

## 2024-03-20 NOTE — PROGRESS NOTES
Name: Albert Sidhu  Date: 3/20/2024    HISTORY OF PRESENT ILLNESS   Albert Sidhu is a 53 year old male who presents for diabetes mellitus, diagnosed approximately 5 years ago.      Prior HbA, C or glycohemoglobin were 8.5% 11/2018; 6.8% 6/2019; 6.5% 1/2020; 6.9% 12/2020; 7.9% 9/2021; 7.8% 6/2022; 5.0% 10/2022; 5.8% 9/2023; 5.6% POC Today   Dietary compliance: Good; significant vege intake, he was able to lose 40lbs after diagnosis; trying to limit bread   Exercise: Yes - 17,000-19,000 per steps -->less over the past few months due to ankle sprain   Polyuria/polydipsia: No  Blurred vision: No    Episodes of hypoglycemia: No  Blood Glucose:  Checking infrequently     Medications for DM   Farxiga 10mg PO daily   Ozempic 0.5mg subcutaneous weekly       REVIEW OF SYSTEMS  Eyes: Diabetic retinopathy present: No            Most recent visit to eye doctor in last 12 months: Yes    CV: Cardiovascular disease present: No         Hypertension present: No         Hyperlipidemia present: Yes         Peripheral Vascular Disease present: No    : Nephropathy present: No    Neuro: Neuropathy present: No    Skin: Infection or ulceration: No    Osteoporosis: No    Thyroid disease: No      Medications:     Current Outpatient Medications:     atorvastatin 10 MG Oral Tab, TAKE 1 TABLET BY MOUTH IN THE EVENING, Disp: 90 tablet, Rfl: 0    dapagliflozin (FARXIGA) 10 MG Oral Tab, Take 1 tablet (10 mg total) by mouth daily., Disp: 90 tablet, Rfl: 0    semaglutide 2 MG/3ML Subcutaneous Solution Pen-injector, Inject 0.5 mg into the skin once a week., Disp: 9 mL, Rfl: 0    Multiple Vitamin (MULTI-DAY) Oral Tab, Take by mouth., Disp: , Rfl:     Omega-3 Fatty Acids (FISH OIL) 1200 MG Oral Cap, Take 1 capsule by mouth daily., Disp: , Rfl:     aspirin 81 MG Oral Tab, Take 1 tablet (81 mg total) by mouth daily., Disp: , Rfl:      Allergies:   Allergies   Allergen Reactions    Penicillins SWELLING       Social History:   Social  History     Socioeconomic History    Marital status:     Number of children: 3   Occupational History    Occupation: Waste sales/operations   Tobacco Use    Smoking status: Former     Packs/day: 0.50     Years: 15.00     Additional pack years: 0.00     Total pack years: 7.50     Types: Cigarettes     Quit date: 2005     Years since quittin.2    Smokeless tobacco: Never   Vaping Use    Vaping Use: Never used   Substance and Sexual Activity    Alcohol use: Yes     Comment: Occasional    Drug use: No    Sexual activity: Yes     Partners: Female       Medical History:   Past Medical History:   Diagnosis Date    Diabetes (HCC)     Diabetic nephropathy (HCC)     Microalbuminuria    Dyslipidemia associated with type 2 diabetes mellitus (HCC)     History of adenomatous polyp of colon 2018    Type 2 diabetes mellitus (HCC) 2013       Surgical history:   Past Surgical History:   Procedure Laterality Date    COLONOSCOPY N/A 2018    Procedure: COLONOSCOPY;  Surgeon: Roberto Ortega MD;  Location: [facility] ENDOSCOPY    KNEE ARTHROSCOPY Right     KNEE ARTHROSCOPY Right     KNEE ARTHROSCOPY Left     KNEE SURGERY Right     ACL repair    OTHER      Deviated septum    TONSILLECTOMY           PHYSICAL EXAM  /70   Pulse 84   Wt 190 lb (86.2 kg)   BMI 27.26 kg/m²     General Appearance:  alert, well developed, in no acute distress  Eyes:  normal conjunctivae, sclera., normal sclera and normal pupils  Throat/Neck: normal sound to voice.   Back: no kyphosis  Respiratory:  non-labored. no increased work of breathing.    Lymph Nodes:  No abnormal nodes noted  Skin:  normal moisture and skin texture  Hematologic:  no excessive bruising  Psychiatric:  oriented to time, self, and place      ASSESSMENT/PLAN:      1. Diabetes Mellitus Type 2, controlled  -controlled  -Congratulated patient on well controlled levels   -Discussed importance of glycemic control to prevent  complications of diabetes  -Discussed complications of diabetes include retinopathy, neuropathy, nephropathy and cardiovascular disease  -Discussed importance of SBGM  -Discussed importance of low CHO diet, recommend 45gm per meal or 135gm per day  -Continue Farxiga 10mg PO daily  -Continue Ozempic 0.5mg subcutaneous weekly, verbalized understanding of risks and benefits  -Normal foot exam performed 9/2023   -Normotensive   -Recheck CMP, lipids, MAB, TSH before next visit     RTC 6 months     3/20/2024  Ninoska Ayala MD

## 2024-03-24 DIAGNOSIS — E78.5 HYPERLIPIDEMIA, UNSPECIFIED HYPERLIPIDEMIA TYPE: ICD-10-CM

## 2024-03-25 RX ORDER — DAPAGLIFLOZIN 10 MG/1
10 TABLET, FILM COATED ORAL DAILY
Qty: 90 TABLET | Refills: 1 | OUTPATIENT
Start: 2024-03-25

## 2024-03-25 RX ORDER — ATORVASTATIN CALCIUM 10 MG/1
TABLET, FILM COATED ORAL
Qty: 90 TABLET | Refills: 1 | OUTPATIENT
Start: 2024-03-25

## 2024-03-25 NOTE — TELEPHONE ENCOUNTER
Endocrine refill protocol for lipid lowering medications      Protocol Criteria:  Appointment with Endocrinology completed in the last 6 months or scheduled in the next 3 months     Verify appointment has been completed or scheduled in the appropriate timeline. If so can send a 90 day supply with 1 refill.   Lipid panel must have been completed in the last 12 months   ALT result <80  LDL result <130    Cholesterol: 195, done on 8/14/2023.  HDL Cholesterol: 56, done on 8/14/2023.  LDL Cholesterol: 124, done on 8/14/2023.  TriGlycerides 82, done on 8/14/2023.       Last completed office visit: 3/20/24  Next scheduled Follow up: No appointment scheduled     Last Lipid panel date: 9/20/23    Last ALT result:   Component      Latest Ref Rng 9/20/2023   ALT (SGPT)      9 - 46 U/L 46          Last LDL result:  Component      Latest Ref Rng 9/20/2023   LDL Cholesterol Calc      mg/dL (calc) 79          MCM sent to patient to schedule appointment with MD in 09/2024.

## 2024-03-25 NOTE — TELEPHONE ENCOUNTER
Duplicate refill request for Semaglutide, Farxiga, and Atorvastatin. Dr. Ayala has signed order 3/20/24.

## 2024-04-10 ENCOUNTER — TELEPHONE (OUTPATIENT)
Facility: CLINIC | Age: 54
End: 2024-04-10

## 2024-04-10 DIAGNOSIS — Z86.010 PERSONAL HISTORY OF COLONIC POLYPS: Primary | ICD-10-CM

## 2024-04-10 NOTE — TELEPHONE ENCOUNTER
Patient calling to schedule 5 year colonoscopy, patient is overdue. Patient informed of t he 5 business day turnaround,no changes and would like to proceed. Please call at [phone],thanks.

## 2024-04-11 NOTE — TELEPHONE ENCOUNTER
Left message to call back on cell,home and work number.    Last Procedure, Date, MD:  colonoscopy 11/30/2018  Last Diagnosis:  1.  Diminutive cecal polyp  2.  Otherwise normal colonoscopy to the terminal ileum  Recalled (mth/yrs): 5 years  Sedation Used Previously:  MAC  Last Prep Used (if known):  suprep  Quality Of Prep (if known): good  Anticoagulants:  Diuretics:   Diabetic Med's (PO/Injectables):   Weight loss Med's:  Iron/Herbal/Multivitamin Supplements (RX/OTC):  Marijuana/Vaping/CBD:  Height & Weight:5'10/188  BMI:26.98  Hx of Cardiac/CVA Issues/(MI/Stroke):  Devices Pacemaker/Defibrillator/Stents:  Respiratory Issues/Oxygen Use/DYANA/COPD:  Issues w/ Anesthesia:    Symptoms (Y/N):   Symptoms Details:     Special Comments/Notes:    Please advise on orders and prep.     Thank you!     Date of Procedure:  11/30/18        Preoperative Diagnosis:  Colorectal cancer screening        Postoperative Diagnosis:  Diminutive cecal polyp        Procedure:    Colonoscopy with polypectomy        Surgeon:  Roberto Ortega M.D.        Anesthesia:  Monitored anesthesia care  Cecal withdrawal time: 29 minutes        Brief History:  This is a 48 year old male who presents for a screening colonoscopy.  He has had no lower gastrointestinal tract symptoms, signs or family history.        Technique:  After informed consent, the patient was placed in the left lateral recumbent position.  Digital rectal examination revealed no palpable intraluminal abnormalities.  An Olympus variable stiffness 190 series HD colonoscope was inserted into the rectum and advanced under direct vision by following the lumen to the terminal ileum.  The colon was examined upon withdrawal in the left lateral recumbent position.       Findings:  The preparation of the colon was good, however, a significant amount of irrigation was required to remove loosely adherent inspissated bile-stained material.  The terminal ileum was examined for several cm and  visually normal.  The ileocecal valve was well preserved. The visualized colonic mucosa from the cecum to the anal verge was normal with an intact vascular pattern.  In the base of the cecum there was a 3 mm sessile polyp which was cold snare excised and retrieved via suction.  No ongoing bleeding.  There were no other colonic polyps, definite diverticula, mass lesions, vascular anomalies or signs of inflammation seen.  Retroflexion in the rectum revealed no abnormalities.  The procedure was well tolerated without immediate complication.        Impression:  1.  Diminutive cecal polyp  2.  Otherwise normal colonoscopy to the terminal ileum     Recommendations:  Follow-up biopsy results.  Polyp histology to determine recommendations regarding follow-up.           Roberto Ortega MD  11/30/2018        Final Diagnosis:      Cecum polyp; biopsy:  Tubular adenoma

## 2024-04-18 ENCOUNTER — TELEPHONE (OUTPATIENT)
Dept: ENDOCRINOLOGY CLINIC | Facility: CLINIC | Age: 54
End: 2024-04-18

## 2024-04-18 DIAGNOSIS — E78.5 HYPERLIPIDEMIA, UNSPECIFIED HYPERLIPIDEMIA TYPE: ICD-10-CM

## 2024-04-18 NOTE — TELEPHONE ENCOUNTER
Pt calling for refill.  He states pharmacy did not receive Rx on 3/20/24 but had received a denial on 3/24/24.          semaglutide 2 MG/3ML Subcutaneous Solution Pen-injector, Inject 0.5 mg into the skin once a week., Disp: 9 mL, Rfl: 1

## 2024-04-18 NOTE — TELEPHONE ENCOUNTER
Called and spoke to patient, informed him his medication request is available at his pharmacy. Also, told him Ozempic is going through PA.    Patient's RX benefit has changed to:    Med 1  Rx bin: [id number]  RX PCN; [id number]  RX GRP: [id number]    PA for Ozempic initiated on CoverMyMeds.com  [id number]    Called [facility], spoke to Connie, informed her PA has been started for Ozempic.

## 2024-04-23 RX ORDER — SODIUM, POTASSIUM,MAG SULFATES 17.5-3.13G
SOLUTION, RECONSTITUTED, ORAL ORAL
Qty: 1 EACH | Refills: 0 | Status: SHIPPED | OUTPATIENT
Start: 2024-04-23

## 2024-04-23 NOTE — TELEPHONE ENCOUNTER
Dr Mejia    Called patient for CLN recall  Medications, pharmacy, and allergies verified   **Please advise on colonoscopy and bowel prep orders**.   › Insurance:  [facility] [employer]  › Last PCP Visit: 4/26/2023  › Last CBC drawn: 9/20/2023  › H/W/BMI: 5'9.5/ 180 lbs/ 26.58    Special comments/notes:  Recall details:     Last Procedure, Date, MD:    11/30/2018 CLN by Dr BAÑUELOS   Last diagnosis:  Tubular adenoma   Recalled for (mth/yrs):  5 yrs (November 2023)   Sedation used previously:  MAC   Last Prep Used:  suprep   Quality of Prep:  Good, significant amount of irrigation was required to removebile stained material     Telephone colon screening Questionnaires:  Yes No   Are you currently experiencing any new GI symptoms? [] [x]   If yes, symptom details:     Rectal Bleeding with or without bowel movements: [] [x]   Black stool: [] [x]   Dysphagia &Food feeling/getting stuck: [] [x]   Intractable Vomiting: [] [x]   Unexplained weight loss: [] [x]   First colonoscopy? [] [x]   Family history of colon cancer? [] [x]   Any issues with anesthesia? [] [x]   If yes, explain:      Personal history of Resp. Issues/Oxygen Use/DYANA/COPD? [] [x]   If yes, CPAP/BiPAP? [] []   History of devices Pacemaker/Defibrillator/Stents? [] [x]   History of Cardiac/CVA issues/(MI/Stroke):  [] [x]     Medication usage:  Yes  No   Anticoagulants:  Anticoagulant (Except Aspirin) ? Route to RN staff to obtain ordering provider orders [] [x]   Diabetic Meds: semaglutide, farxiga  PO DM Meds ? hold day prior and day of procedure  Insulin ? Route to RN clinical staff to obtain provider orders  [x] []   Weight loss meds (Phentermine/Vyvanse/Saxsenda/etc): [] [x]   Iron/Herbal/Multivitamin Supplement (RX/OTC): MVI, omega 3 [x] []   Usage of marijuana, CBD &/or vape products: [] [x]        Roberto Ortega MD   Physician  Gastroenterology     Operative Report  Signed     Date of Service: 11/30/2018  2:01 PM     Signed         [facility]  Endoscopy Report        Date of Procedure:  11/30/18     Preoperative Diagnosis:  Colorectal cancer screening     Postoperative Diagnosis:  Diminutive cecal polyp     Procedure:    Colonoscopy with polypectomy     Surgeon:  Roberto Ortega M.D.     Anesthesia:  Monitored anesthesia care  Cecal withdrawal time: 29 minutes     Brief History:  This is a 48 year old male who presents for a screening colonoscopy.  He has had no lower gastrointestinal tract symptoms, signs or family history.     Technique:  After informed consent, the patient was placed in the left lateral recumbent position.  Digital rectal examination revealed no palpable intraluminal abnormalities.  An Olympus variable stiffness 190 series HD colonoscope was inserted into the rectum and advanced under direct vision by following the lumen to the terminal ileum.  The colon was examined upon withdrawal in the left lateral recumbent position.       Findings:  The preparation of the colon was good, however, a significant amount of irrigation was required to remove loosely adherent inspissated bile-stained material.  The terminal ileum was examined for several cm and visually normal.  The ileocecal valve was well preserved. The visualized colonic mucosa from the cecum to the anal verge was normal with an intact vascular pattern.  In the base of the cecum there was a 3 mm sessile polyp which was cold snare excised and retrieved via suction.  No ongoing bleeding.  There were no other colonic polyps, definite diverticula, mass lesions, vascular anomalies or signs of inflammation seen.  Retroflexion in the rectum revealed no abnormalities.  The procedure was well tolerated without immediate complication.     Impression:  1.  Diminutive cecal polyp  2.  Otherwise normal colonoscopy to the terminal ileum     Recommendations:  Follow-up biopsy results.  Polyp histology to determine recommendations regarding follow-up.                 Roberto Ortega  MD  12/3/2018  6:53 PM CST       Please see the following My Chart message sent to the patient:     Albert,     I left a message on your voicemail.     Your colonoscopy revealed #1 small polyp which was removed.  This polyp was a tubular adenoma.  This is the type of polyp that has a potential to become a tumor or cancer, however, at this stage was small, benign and completely removed.  Most polyps of this size and type do not progress to cancer.     Based on the small adenoma, I would recommend a follow-up colonoscopy in 5 years.  If no additional polyps are present at that time, the interval can be increased to 10 years.     If I can answer any questions regarding the above, please do not hesitate to contact me.     Sincerely,     Dr. Ortega     GI RN staff: Please enter colonoscopy recall for 5 years.       Final Diagnosis:      Cecum polyp; biopsy:  Tubular adenoma

## 2024-04-23 NOTE — TELEPHONE ENCOUNTER
May schedule a colonoscopy for a personal history of colon polyps following a split dose Suprep and monitored anesthesia care.  Please also have the patient take 17 g daily of MiraLAX for 3 days preceding the procedure.  Hold Farxiga for 4 days preceding the procedure and semaglutide for 1 week preceding the procedure

## 2024-04-23 NOTE — TELEPHONE ENCOUNTER
GI Schedulers    Please call the patient to schedule for colonoscopy     See MD orders below    Re route to GI RNs after scheduling for extended bowel prep instruction.    Thanks

## 2024-04-25 NOTE — TELEPHONE ENCOUNTER
Received fax from [employer] dated on 04/24/24 stating the OZEMPIC 2MG DOSE 8MG/3ML has been approved 3 for 30days supply effective 04/24/24 ends 04/24/25  [facility] sent   ID#[id number]

## 2024-05-01 ENCOUNTER — OFFICE VISIT (OUTPATIENT)
Dept: OPHTHALMOLOGY | Facility: CLINIC | Age: 54
End: 2024-05-01
Payer: COMMERCIAL

## 2024-05-01 DIAGNOSIS — E11.9 DIABETES MELLITUS TYPE 2 WITHOUT RETINOPATHY (HCC): Primary | ICD-10-CM

## 2024-05-01 PROCEDURE — 92014 COMPRE OPH EXAM EST PT 1/>: CPT | Performed by: OPHTHALMOLOGY

## 2024-05-01 NOTE — PROGRESS NOTES
Albert Sidhu is a 53 year old male.    HPI:     HPI    Patient is here for a diabetic exam.  Patient states his vision is stable.  He sees Dr. Ann for his glasses and CL's.  He is happy with his glasses and CL's    Pt has been a diabetic for 12 years       Pt's diabetes is currently controlled by injectable  Pt doesn't checks BS    Pt's last blood sugar was 112 couple of weeks  Last HA1C was 5.6 on 3/20/24  Endocrinologist: Dr Ayala  Last edited by Mine Tompkins OT on 5/1/2024  9:46 AM.        Patient History:  Past Medical History:    Diabetes (HCC)    Diabetic nephropathy (HCC)    Microalbuminuria    Dyslipidemia associated with type 2 diabetes mellitus (HCC)    History of adenomatous polyp of colon    Type 2 diabetes mellitus (HCC)       Surgical History: Albert Sidhu has a past surgical history that includes other (1992) (Deviated septum); knee surgery (Right, 1994) (ACL repair); tonsillectomy (1995); knee arthroscopy (Right, 1996); knee arthroscopy (Right, 1998); knee arthroscopy (Left, 2010); and colonoscopy (N/A, 11/30/2018) (Procedure: COLONOSCOPY;  Surgeon: Roberto Ortega MD;  Location: [facility] ENDOSCOPY).    Family History   Problem Relation Age of Onset    Diabetes Father     Heart Disease Father         MI age 38    Cancer Father 72        lung cancer    Other (Lung Cancer) Father 75    Cancer Sister 42        lung cancer    Other (Lung Cancer) Sister 42    Diabetes Paternal Grandmother     Diabetes Paternal Grandfather     Heart Disease Paternal Grandfather         CHF age 70s    Cancer Maternal Grandfather         unknown cancer    Glaucoma Neg     Macular degeneration Neg        Social History:   Social History     Socioeconomic History    Marital status:     Number of children: 3   Occupational History    Occupation: Waste sales/operations   Tobacco Use    Smoking status: Former     Current packs/day: 0.00     Average packs/day: 0.5 packs/day for 15.0 years (7.5 ttl  pk-yrs)     Types: Cigarettes     Start date: 1990     Quit date: 2005     Years since quittin.3    Smokeless tobacco: Never   Vaping Use    Vaping status: Never Used   Substance and Sexual Activity    Alcohol use: Yes     Comment: Occasional    Drug use: No    Sexual activity: Yes     Partners: Female       Medications:  Current Outpatient Medications   Medication Sig Dispense Refill    Na Sulfate-K Sulfate-Mg Sulf (SUPREP BOWEL PREP KIT) 17.5-3.13-1.6 GM/177ML Oral Solution Take as directed 1 each 0    semaglutide 2 MG/3ML Subcutaneous Solution Pen-injector Inject 0.5 mg into the skin once a week. 9 mL 1    atorvastatin 10 MG Oral Tab TAKE 1 TABLET BY MOUTH IN THE EVENING 90 tablet 1    dapagliflozin (FARXIGA) 10 MG Oral Tab Take 1 tablet (10 mg total) by mouth daily. 90 tablet 1    Multiple Vitamin (MULTI-DAY) Oral Tab Take 1 tablet by mouth daily.      Omega-3 Fatty Acids (FISH OIL) 1200 MG Oral Cap Take 1 capsule by mouth daily.      aspirin 81 MG Oral Tab Take 1 tablet (81 mg total) by mouth daily.         Allergies:  Allergies   Allergen Reactions    Penicillins SWELLING       ROS:     ROS    Positive for: Eyes  Last edited by Mine Tompkins OT on 2024  9:46 AM.          PHYSICAL EXAM:     Base Eye Exam       Visual Acuity (Snellen - Linear)         Right Left    Dist cc 20/20 20/15    Near sc 20/20 20/20      Correction: Glasses              Tonometry (Icare, 10:00 AM)         Right Left    Pressure 17 16              Pupils         Pupils    Right PERRL    Left PERRL              Visual Fields         Left Right     Full Full              Extraocular Movement         Right Left     Full, Ortho Full, Ortho              Neuro/Psych       Oriented x3: Yes    Mood/Affect: Normal              Dilation       Both eyes: 1.0% Mydriacyl and 2.5% Campos Synephrine @ 10:00 AM                  Slit Lamp and Fundus Exam       External Exam         Right Left    External Normal Normal              Slit  Lamp Exam         Right Left    Lids/Lashes Meibomian gland dysfunction  Meibomian gland dysfunction    Conjunctiva/Sclera Normal Normal    Cornea Clear Clear    Anterior Chamber Deep and quiet Deep and quiet    Iris Normal Normal    Lens Clear Clear    Vitreous Clear Clear              Fundus Exam         Right Left    Disc Sloping margin, Temporal crescent Sloping margin, Temporal crescent    C/D Ratio 0.45 0.45    Macula Normal- no BDR Normal- no BDR    Vessels Normal Normal    Periphery Normal Normal                  Refraction       Wearing Rx         Sphere Cylinder Axis    Right -2.25 +0.50 158    Left -2.50 +0.50 012      Age: 1yr    Type: Distance only   Patient does not want a refraction.                    ASSESSMENT/PLAN:     Diagnoses and Plan:     Diabetes mellitus type 2 without retinopathy (HCC)  Diabetes type II: no background of retinopathy, no signs of neovascularization noted.  Discussed ocular and systemic benefits of blood sugar control.  Diagnosis and treatment discussed in detail with patient.  Stressed importance of yearly eye exams due to diabetes.        No orders of the defined types were placed in this encounter.      Meds This Visit:  Requested Prescriptions      No prescriptions requested or ordered in this encounter        Follow up instructions:  Return in about 1 year (around 5/1/2025) for Diabetic eye exam.    5/1/2024  Scribed by: Major Brown MD       Statement Selected

## 2024-05-01 NOTE — PATIENT INSTRUCTIONS
Diabetes mellitus type 2 without retinopathy (HCC)  Diabetes type II: no background of retinopathy, no signs of neovascularization noted.  Discussed ocular and systemic benefits of blood sugar control.  Diagnosis and treatment discussed in detail with patient.  Stressed importance of yearly eye exams due to diabetes.

## 2024-05-01 NOTE — ASSESSMENT & PLAN NOTE
Diabetes type II: no background of retinopathy, no signs of neovascularization noted.  Discussed ocular and systemic benefits of blood sugar control.  Diagnosis and treatment discussed in detail with patient.  Stressed importance of yearly eye exams due to diabetes.

## 2024-05-09 NOTE — TELEPHONE ENCOUNTER
Scheduled for:  Colonoscopy 22980  Provider Name:  Dr. Ortega  Date:  8/16/2024  Location:  \[facility]\""C  Sedation:  MAC  Time:  1:15pm, pt is aware eosc will call with arrival   Prep:  Suprep  Meds/Allergies Reconciled?:  Physician reviewed     Diagnosis with codes:  Hx of colon polyps Z86.010  Was patient informed to call insurance with codes (Y/N):  Yes, I confirmed [employer] insurance with this patient.      Referral sent?:  Referral was sent at the time of electronic surgical scheduling.   EM or [facility] notified?:  I sent an electronic request to Endo Scheduling and received a confirmation today.      Medication Orders:  Hold Farxiga for 4 days preceding the procedure and semaglutide for 1 week preceding the procedure   Misc Orders:  17 g daily of MiraLAX for 3 days preceding the procedure.      Further instructions given by staff:   I discussed the prep instructions with the patient which he verbally understood and is aware that I will mail the instructions today.

## 2024-06-23 DIAGNOSIS — E78.5 HYPERLIPIDEMIA, UNSPECIFIED HYPERLIPIDEMIA TYPE: ICD-10-CM

## 2024-06-24 RX ORDER — DAPAGLIFLOZIN 10 MG/1
10 TABLET, FILM COATED ORAL DAILY
Qty: 90 TABLET | Refills: 1 | Status: SHIPPED | OUTPATIENT
Start: 2024-06-24

## 2024-06-24 RX ORDER — ATORVASTATIN CALCIUM 10 MG/1
TABLET, FILM COATED ORAL
Qty: 90 TABLET | Refills: 1 | Status: SHIPPED | OUTPATIENT
Start: 2024-06-24

## 2024-06-24 NOTE — TELEPHONE ENCOUNTER
Endocrine Refill protocol for oral and injectable diabetic medications    Protocol Criteria:pass    -Appointment with Endocrinology completed in the last 6 months or scheduled in the next 3 months    -A1c result <8.5% in the past 6 months      Verify the above has been completed or scheduled in the appropriate timeline. If so can send a 90 day supply with 1 refill.     Last completed office visit: 3/20/2024 Ninoska Ayala MD   Next scheduled Follow up: 09/18/24     Last A1c result: Last A1c value was 5.6% done 3/20/2024.

## 2024-07-09 ENCOUNTER — APPOINTMENT (OUTPATIENT)
Dept: GENERAL RADIOLOGY | Age: 54
End: 2024-07-09
Attending: NURSE PRACTITIONER
Payer: COMMERCIAL

## 2024-07-09 ENCOUNTER — HOSPITAL ENCOUNTER (EMERGENCY)
Age: 54
Discharge: HOME | End: 2024-07-09
Payer: COMMERCIAL

## 2024-07-09 VITALS
HEIGHT: 69 IN | OXYGEN SATURATION: 97 % | SYSTOLIC BLOOD PRESSURE: 110 MMHG | HEART RATE: 90 BPM | WEIGHT: 175 LBS | BODY MASS INDEX: 25.92 KG/M2 | DIASTOLIC BLOOD PRESSURE: 85 MMHG | TEMPERATURE: 98 F | RESPIRATION RATE: 17 BRPM
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
## 2024-07-09 DIAGNOSIS — S61.452A DOG BITE OF LEFT HAND, INITIAL ENCOUNTER: Primary | ICD-10-CM

## 2024-07-09 DIAGNOSIS — T14.8XXA PUNCTURE WOUND: ICD-10-CM

## 2024-07-09 DIAGNOSIS — S61.411A LACERATION OF RIGHT HAND WITHOUT FOREIGN BODY, INITIAL ENCOUNTER: ICD-10-CM

## 2024-07-09 DIAGNOSIS — T14.8XXA ABRASION: ICD-10-CM

## 2024-07-09 PROCEDURE — 73130 X-RAY EXAM OF HAND: CPT | Performed by: NURSE PRACTITIONER

## 2024-07-09 PROCEDURE — 99283 EMERGENCY DEPT VISIT LOW MDM: CPT

## 2024-07-09 PROCEDURE — 90471 IMMUNIZATION ADMIN: CPT

## 2024-07-09 RX ORDER — METRONIDAZOLE 500 MG/1
500 TABLET ORAL 3 TIMES DAILY
Qty: 30 TABLET | Refills: 0 | Status: SHIPPED | OUTPATIENT
Start: 2024-07-09 | End: 2024-07-19

## 2024-07-09 RX ORDER — DOXYCYCLINE HYCLATE 100 MG/1
100 CAPSULE ORAL 2 TIMES DAILY
Qty: 14 CAPSULE | Refills: 0 | Status: SHIPPED | OUTPATIENT
Start: 2024-07-09 | End: 2024-07-16

## 2024-07-09 NOTE — DISCHARGE INSTRUCTIONS
Keep laceration site clean and dry at all times.  Sutures are to be removed within 7 to 10 days from the right hand.  Please follow-up with a hand specialist within 1 week.  Left-handed duty only until cleared by hand specialist.

## 2024-07-09 NOTE — ED PROVIDER NOTES
Patient Seen in: [facility] In [place]      History     Chief Complaint   Patient presents with    Bite     Stated Complaint: dog bite to right hand    Subjective:   HPI    53-year-old male presents today with complaints of dog bite to the right hand and left forearm.  Patient states that he was giving his Macedonian Romano who is 6 years old his allergy medication in cheese that he would not take it so he tried to shove it down the dog's mouth and the dog growled and bit him.  Patient states that he punched the dog in the face to get him off him.  Patient states that the dog is never done this before.  Patient states that he believes the dog is upset because his wife has been gone for about a month and a half.  Patient states that he is hardly home and his 17-year-old son let the dog out to go to the bathroom.  Patient states that he first went to [facility] and then left the emergency room due to the long wait.    Objective:   Past Medical History:    Diabetes (HCC)    Diabetic nephropathy (HCC)    Microalbuminuria    Dyslipidemia associated with type 2 diabetes mellitus (HCC)    History of adenomatous polyp of colon    Hyperlipidemia    Type 2 diabetes mellitus (HCC)              Past Surgical History:   Procedure Laterality Date    Colonoscopy N/A 11/30/2018    Procedure: COLONOSCOPY;  Surgeon: Roberto Ortega MD;  Location: [facility] ENDOSCOPY    Knee arthroscopy Right 1996    Knee arthroscopy Right 1998    Knee arthroscopy Left 2010    Knee surgery Right 1994    ACL repair    Other  1992    Deviated septum    Tonsillectomy  1995                Social History     Socioeconomic History    Marital status:     Number of children: 3   Occupational History    Occupation: Waste sales/operations   Tobacco Use    Smoking status: Former     Current packs/day: 0.00     Average packs/day: 0.5 packs/day for 15.0 years (7.5 ttl pk-yrs)     Types: Cigarettes     Start date: 1/1/1990     Quit date:  2005     Years since quittin.5    Smokeless tobacco: Never   Vaping Use    Vaping status: Never Used   Substance and Sexual Activity    Alcohol use: Yes     Comment: Occasional    Drug use: No    Sexual activity: Yes     Partners: Female              Review of Systems   Constitutional: Negative.    HENT: Negative.     Eyes: Negative.    Respiratory: Negative.     Cardiovascular: Negative.    Gastrointestinal: Negative.    Endocrine: Negative.    Genitourinary: Negative.    Musculoskeletal: Negative.    Skin:  Positive for wound.   Allergic/Immunologic: Negative.    Neurological: Negative.    Hematological: Negative.    Psychiatric/Behavioral: Negative.         Positive for stated Chief Complaint: Bite    Other systems are as noted in HPI.  Constitutional and vital signs reviewed.      All other systems reviewed and negative except as noted above.    Physical Exam     ED Triage Vitals [24 1238]   /85   Pulse 90   Resp 17   Temp 98.2 °F (36.8 °C)   Temp src    SpO2 97 %   O2 Device None (Room air)       Current Vitals:   Vital Signs  BP: 110/85  Pulse: 90  Resp: 17  Temp: 98.2 °F (36.8 °C)    Oxygen Therapy  SpO2: 97 %  O2 Device: None (Room air)            Physical Exam  Vitals and nursing note reviewed.   Constitutional:       Appearance: Normal appearance. He is normal weight.   HENT:      Head: Normocephalic.      Comments: Scratch rocio noted to patient's right cheek.     Nose: Nose normal.      Mouth/Throat:      Mouth: Mucous membranes are moist.      Pharynx: Oropharynx is clear.   Eyes:      Extraocular Movements: Extraocular movements intact.      Conjunctiva/sclera: Conjunctivae normal.      Pupils: Pupils are equal, round, and reactive to light.   Cardiovascular:      Rate and Rhythm: Normal rate and regular rhythm.      Pulses: Normal pulses.      Heart sounds: Normal heart sounds.   Pulmonary:      Effort: Pulmonary effort is normal.      Breath sounds: Normal breath sounds.    Musculoskeletal:         General: Normal range of motion.      Cervical back: Normal range of motion and neck supple.   Skin:     General: Skin is warm.      Findings: Erythema and lesion present.      Comments: Scratch marks noted to patient's right cheek, bilateral upper legs, left arm.  Puncture wound noted to the palmar and dorsal aspect of the first right digit.  Puncture/laceration to the palmar aspect is approximately 4 cm long.  Puncture wound noted to the anterior left upper forearm.   Neurological:      General: No focal deficit present.      Mental Status: He is alert.   Psychiatric:         Mood and Affect: Mood normal.             ED Course   Labs Reviewed - No data to display  Multiple abrasions, puncture wounds and laceration cleansed copiously with normal saline and peroxide.  Local infiltration with 2 mL of 1% lidocaine injected into the 3 cm linear laceration to the right palm over the first digit.  3 simple interrupted 5 point 0 Ethilon sutures used to approximate the wound.  Bacitracin Telfa and nonstick gauze with Kerlix and Coban used to dress the wound.  Dorsal aspect of right hand with 1 cm puncture wound noted.  Lateral aspect of right wrist with 1 cm puncture wound noted.  These were left open as they are well-approximated and not gaping.  Several small abrasions and scratches noted to the right forearm that were cleansed.  Small puncture noted to the left forearm kept open and dressed with a Band-Aid.                 MDM        53-year-old male presents today with complaints of dog bite to the right hand and left forearm.  Patient states that he was giving his Maori Romano who is 6 years old his allergy medication in cheese that he would not take it so he tried to shove it down the dog's mouth and the dog growled and bit him.  Patient states that he punched the dog in the face to get him off him.  Patient states that the dog is never done this before.  Patient states that he believes  the dog is upset because his wife has been gone for about a month and a half.  Patient states that he is hardly home and his 17-year-old son let the dog out to go to the bathroom.  Patient states that he first went to [facility] and then left the emergency room due to the long wait.  Vital signs: Please see EMR.  Physical exam: Please see exam.  Differential diagnosis: Dog bite, laceration, osteomyelitis, open fracture, abrasion.  XR HAND (MIN 3 VIEWS), RIGHT (CPT=73130)    Result Date: 7/9/2024  CONCLUSION:    No acute bone abnormality.  No foreign body.  Gas within the soft tissues medial hand from dog bite laceration, and tissue swelling medial and dorsum of the hand and dorsum of the wrist. LOCATION:  [facility]   Dictated by (CST): Lucas Soto MD on 7/09/2024 at 1:32 PM     Finalized by (CST): Lucas Soto MD on 7/09/2024 at 1:33 PM      Will diagnosed with dog bite, puncture wound, hand laceration, abrasion.  Will treat with doxycycline and metronidazole due to patient's penicillin allergy.  Patient is to follow-up with hand specialist.  ED precautions given.  Note to Patient  The 21st Century Cures Act makes medical notes like these available to patients in the interest of transparency. However, be advised this is a medical document and is intended as [id number]-[id number] communication; it is written in medical language and may appear blunt, direct, or contain abbreviations or verbiage that are unfamiliar. Medical documents are intended to carry relevant information, facts as evident, and the clinical opinion of the practitioner.     This report has been produced using speech recognition software, and may contain errors related to grammar, punctuation, spelling, words or phrases unrecognized or not translated appropriately to text; these errors may be referred to the dictating provider for further clarification and/or addendum as needed.                                     Medical Decision Making    53-year-old  male presents today with complaints of dog bite to the right hand and left forearm.  Patient states that he was giving his Finnish Romano who is 6 years old his allergy medication in cheese that he would not take it so he tried to shove it down the dog's mouth and the dog growled and bit him.  Patient states that he punched the dog in the face to get him off him.  Patient states that the dog is never done this before.  Patient states that he believes the dog is upset because his wife has been gone for about a month and a half.  Patient states that he is hardly home and his 17-year-old son let the dog out to go to the bathroom.  Patient states that he first went to [facility] and then left the emergency room due to the long wait.    Problems Addressed:  Abrasion: acute illness or injury  Dog bite of left hand, initial encounter: acute illness or injury  Laceration of right hand without foreign body, initial encounter: acute illness or injury  Puncture wound: acute illness or injury    Amount and/or Complexity of Data Reviewed  Radiology: ordered. Decision-making details documented in ED Course.  ECG/medicine tests: ordered. Decision-making details documented in ED Course.    Risk  Prescription drug management.        Disposition and Plan     Clinical Impression:  1. Dog bite of left hand, initial encounter    2. Laceration of right hand without foreign body, initial encounter    3. Puncture wound    4. Abrasion         Disposition:  Discharge  7/9/2024  1:39 pm    Follow-up:  Lm Rondon MD  [address] [place] [id number]  [phone]    Follow up in 2 day(s)  For wound re-check          Medications Prescribed:  Current Discharge Medication List        START taking these medications    Details   doxycycline 100 MG Oral Cap Take 1 capsule (100 mg total) by mouth 2 (two) times daily for 7 days.  Qty: 14 capsule, Refills: 0      metRONIDAZOLE 500 MG Oral Tab Take 1 tablet (500 mg total) by mouth  3 (three) times daily for 10 days.  Qty: 30 tablet, Refills: 0

## 2024-07-29 ENCOUNTER — PATIENT MESSAGE (OUTPATIENT)
Dept: ENDOCRINOLOGY CLINIC | Facility: CLINIC | Age: 54
End: 2024-07-29

## 2024-07-29 ENCOUNTER — TELEPHONE (OUTPATIENT)
Facility: CLINIC | Age: 54
End: 2024-07-29

## 2024-07-29 DIAGNOSIS — Z86.0100 PERSONAL HISTORY OF COLONIC POLYPS: Primary | ICD-10-CM

## 2024-07-29 NOTE — TELEPHONE ENCOUNTER
PPD-    Patient colonoscopy [address]647 is now scheduled on 11/22/2024 with Dr. Ortega at [facility],dx codes: hx of colon polyps Z86.010.    Thanks!

## 2024-07-29 NOTE — TELEPHONE ENCOUNTER
Rescheduled for:  Colonoscopy [address]  Provider: Dr. Ortega  Date: FROM 8/16/2024 TO 11/22/2024  Location:  [facility]  Sedation:  MAC  Time:  FROM 1:15pm,TO 8:15am  pt is aware [facility] will call with arrival   Prep:  Suprep  Meds/Allergies Reconciled?:  Physician reviewed      Diagnosis with codes:  Hx of colon polyps Z86.010  Was patient informed to call insurance with codes (Y/N):  Yes, I confirmed [employer] insurance with this patient.      Referral sent?:  Referral was sent at the time of electronic surgical scheduling.   EM or [facility] notified?:  I sent an electronic request to Endo Scheduling and received a confirmation today.      Medication Orders:  Hold Farxiga for 4 days preceding the procedure and semaglutide for 1 week preceding the procedure   Misc Orders:  17 g daily of MiraLAX for 3 days preceding the procedure.      Further instructions given by staff:   I discussed the prep instructions with the patient which he verbally understood and is aware that I will mail the instructions today.

## 2024-07-30 NOTE — TELEPHONE ENCOUNTER
Dr. Ayala,     Pt is requesting script for Hamlet 3 sensor. Pt is not on insulin. Script pended. Please advise. Thank you!

## 2024-07-31 RX ORDER — BLOOD-GLUCOSE SENSOR
1 EACH MISCELLANEOUS
Qty: 6 EACH | Refills: 1 | Status: SHIPPED | OUTPATIENT
Start: 2024-07-31

## 2024-09-18 ENCOUNTER — LAB ENCOUNTER (OUTPATIENT)
Dept: LAB | Age: 54
End: 2024-09-18
Attending: INTERNAL MEDICINE
Payer: COMMERCIAL

## 2024-09-18 ENCOUNTER — OFFICE VISIT (OUTPATIENT)
Dept: ENDOCRINOLOGY CLINIC | Facility: CLINIC | Age: 54
End: 2024-09-18

## 2024-09-18 VITALS
BODY MASS INDEX: 28 KG/M2 | WEIGHT: 187.5 LBS | DIASTOLIC BLOOD PRESSURE: 80 MMHG | SYSTOLIC BLOOD PRESSURE: 129 MMHG | HEART RATE: 90 BPM

## 2024-09-18 DIAGNOSIS — E11.9 CONTROLLED TYPE 2 DIABETES MELLITUS WITHOUT COMPLICATION, WITHOUT LONG-TERM CURRENT USE OF INSULIN (HCC): Primary | ICD-10-CM

## 2024-09-18 LAB
ALBUMIN SERPL-MCNC: 4.7 G/DL (ref 3.2–4.8)
ALBUMIN/GLOB SERPL: 1.5 {RATIO} (ref 1–2)
ALP LIVER SERPL-CCNC: 66 U/L
ALT SERPL-CCNC: 44 U/L
ANION GAP SERPL CALC-SCNC: 7 MMOL/L (ref 0–18)
AST SERPL-CCNC: 26 U/L (ref ?–34)
BASOPHILS # BLD AUTO: 0.03 X10(3) UL (ref 0–0.2)
BASOPHILS NFR BLD AUTO: 0.5 %
BILIRUB SERPL-MCNC: 1.8 MG/DL (ref 0.3–1.2)
BUN BLD-MCNC: 15 MG/DL (ref 9–23)
BUN/CREAT SERPL: 16.9 (ref 10–20)
CALCIUM BLD-MCNC: 9.5 MG/DL (ref 8.7–10.4)
CHLORIDE SERPL-SCNC: 106 MMOL/L (ref 98–112)
CHOLEST SERPL-MCNC: 133 MG/DL (ref ?–200)
CO2 SERPL-SCNC: 26 MMOL/L (ref 21–32)
CREAT BLD-MCNC: 0.89 MG/DL
CREAT UR-SCNC: 64.7 MG/DL
DEPRECATED RDW RBC AUTO: 42.3 FL (ref 35.1–46.3)
EGFRCR SERPLBLD CKD-EPI 2021: 101 ML/MIN/1.73M2 (ref 60–?)
EOSINOPHIL # BLD AUTO: 0.15 X10(3) UL (ref 0–0.7)
EOSINOPHIL NFR BLD AUTO: 2.3 %
ERYTHROCYTE [DISTWIDTH] IN BLOOD BY AUTOMATED COUNT: 12.9 % (ref 11–15)
FASTING PATIENT LIPID ANSWER: NO
FASTING STATUS PATIENT QL REPORTED: NO
GLOBULIN PLAS-MCNC: 3.2 G/DL (ref 2–3.5)
GLUCOSE BLD-MCNC: 168 MG/DL (ref 70–99)
GLUCOSE BLOOD: 217
HCT VFR BLD AUTO: 49.7 %
HDLC SERPL-MCNC: 42 MG/DL (ref 40–59)
HEMOGLOBIN A1C: 6.7 % (ref 4.3–5.6)
HGB BLD-MCNC: 18.5 G/DL
IMM GRANULOCYTES # BLD AUTO: 0.02 X10(3) UL (ref 0–1)
IMM GRANULOCYTES NFR BLD: 0.3 %
LDLC SERPL CALC-MCNC: 71 MG/DL (ref ?–100)
LYMPHOCYTES # BLD AUTO: 1.25 X10(3) UL (ref 1–4)
LYMPHOCYTES NFR BLD AUTO: 19.1 %
MCH RBC QN AUTO: 33.7 PG (ref 26–34)
MCHC RBC AUTO-ENTMCNC: 37.2 G/DL (ref 31–37)
MCV RBC AUTO: 90.5 FL
MICROALBUMIN UR-MCNC: 2.8 MG/DL
MICROALBUMIN/CREAT 24H UR-RTO: 43.3 UG/MG (ref ?–30)
MONOCYTES # BLD AUTO: 0.58 X10(3) UL (ref 0.1–1)
MONOCYTES NFR BLD AUTO: 8.9 %
NEUTROPHILS # BLD AUTO: 4.5 X10 (3) UL (ref 1.5–7.7)
NEUTROPHILS # BLD AUTO: 4.5 X10(3) UL (ref 1.5–7.7)
NEUTROPHILS NFR BLD AUTO: 68.9 %
NONHDLC SERPL-MCNC: 91 MG/DL (ref ?–130)
OSMOLALITY SERPL CALC.SUM OF ELEC: 293 MOSM/KG (ref 275–295)
PLATELET # BLD AUTO: 191 10(3)UL (ref 150–450)
POTASSIUM SERPL-SCNC: 4.4 MMOL/L (ref 3.5–5.1)
PROT SERPL-MCNC: 7.9 G/DL (ref 5.7–8.2)
RBC # BLD AUTO: 5.49 X10(6)UL
SODIUM SERPL-SCNC: 139 MMOL/L (ref 136–145)
TEST STRIP LOT #: NORMAL NUMERIC
TRIGL SERPL-MCNC: 109 MG/DL (ref 30–149)
TSI SER-ACNC: 1.04 MIU/ML (ref 0.55–4.78)
VLDLC SERPL CALC-MCNC: 17 MG/DL (ref 0–30)
WBC # BLD AUTO: 6.5 X10(3) UL (ref 4–11)

## 2024-09-18 PROCEDURE — 82570 ASSAY OF URINE CREATININE: CPT | Performed by: INTERNAL MEDICINE

## 2024-09-18 PROCEDURE — 82947 ASSAY GLUCOSE BLOOD QUANT: CPT | Performed by: INTERNAL MEDICINE

## 2024-09-18 PROCEDURE — 80061 LIPID PANEL: CPT | Performed by: INTERNAL MEDICINE

## 2024-09-18 PROCEDURE — 99213 OFFICE O/P EST LOW 20 MIN: CPT | Performed by: INTERNAL MEDICINE

## 2024-09-18 PROCEDURE — 36415 COLL VENOUS BLD VENIPUNCTURE: CPT | Performed by: INTERNAL MEDICINE

## 2024-09-18 PROCEDURE — 85025 COMPLETE CBC W/AUTO DIFF WBC: CPT | Performed by: INTERNAL MEDICINE

## 2024-09-18 PROCEDURE — 83036 HEMOGLOBIN GLYCOSYLATED A1C: CPT | Performed by: INTERNAL MEDICINE

## 2024-09-18 PROCEDURE — 82043 UR ALBUMIN QUANTITATIVE: CPT | Performed by: INTERNAL MEDICINE

## 2024-09-18 PROCEDURE — 80053 COMPREHEN METABOLIC PANEL: CPT | Performed by: INTERNAL MEDICINE

## 2024-09-18 PROCEDURE — 84443 ASSAY THYROID STIM HORMONE: CPT | Performed by: INTERNAL MEDICINE

## 2024-09-18 RX ORDER — DAPAGLIFLOZIN 10 MG/1
10 TABLET, FILM COATED ORAL DAILY
Qty: 90 TABLET | Refills: 1 | Status: SHIPPED | OUTPATIENT
Start: 2024-09-18

## 2024-09-18 RX ORDER — BLOOD-GLUCOSE SENSOR
1 EACH MISCELLANEOUS
Qty: 6 EACH | Refills: 1 | Status: SHIPPED | OUTPATIENT
Start: 2024-09-18

## 2024-09-18 RX ORDER — SEMAGLUTIDE 1.34 MG/ML
1 INJECTION, SOLUTION SUBCUTANEOUS WEEKLY
Qty: 9 ML | Refills: 1 | Status: SHIPPED | OUTPATIENT
Start: 2024-09-18

## 2024-09-18 NOTE — PROGRESS NOTES
Name: Albert Sidhu  Date: 9/18/2024    HISTORY OF PRESENT ILLNESS   Albert Sidhu is a 54 year old male who presents for diabetes mellitus, diagnosed approximately 5 years ago.      Prior HbA, C or glycohemoglobin were 8.5% 11/2018; 6.8% 6/2019; 6.5% 1/2020; 6.9% 12/2020; 7.9% 9/2021; 7.8% 6/2022; 5.0% 10/2022; 5.8% 9/2023; 5.6% 3/2024; 6.7% POC Today   Dietary compliance: Good; significant vege intake, he was able to lose 40lbs after diagnosis; trying to limit bread   Exercise: Yes - 17,000-19,000 per steps -->less over the past few months due to ankle sprain   Polyuria/polydipsia: No  Blurred vision: No    Episodes of hypoglycemia: No  Blood Glucose:  Checking infrequently     Medications for DM   Farxiga 10mg PO daily   Ozempic 0.5mg subcutaneous weekly       REVIEW OF SYSTEMS  Eyes: Diabetic retinopathy present: No            Most recent visit to eye doctor in last 12 months: Yes    CV: Cardiovascular disease present: No         Hypertension present: No         Hyperlipidemia present: Yes         Peripheral Vascular Disease present: No    : Nephropathy present: No    Neuro: Neuropathy present: No    Skin: Infection or ulceration: No    Osteoporosis: No    Thyroid disease: No      Medications:     Current Outpatient Medications:     Continuous Glucose Sensor (FREESTYLE STEF 3 SENSOR) Does not apply Misc, 1 each every 14 (fourteen) days., Disp: 6 each, Rfl: 1    semaglutide 2 MG/3ML Subcutaneous Solution Pen-injector, Inject 0.5 mg into the skin once a week., Disp: 9 mL, Rfl: 1    atorvastatin 10 MG Oral Tab, TAKE 1 TABLET BY MOUTH IN THE EVENING, Disp: 90 tablet, Rfl: 1    dapagliflozin (FARXIGA) 10 MG Oral Tab, Take 1 tablet (10 mg total) by mouth daily., Disp: 90 tablet, Rfl: 1    Na Sulfate-K Sulfate-Mg Sulf (SUPREP BOWEL PREP KIT) 17.5-3.13-1.6 GM/177ML Oral Solution, Take as directed, Disp: 1 each, Rfl: 0    Multiple Vitamin (MULTI-DAY) Oral Tab, Take 1 tablet by mouth daily., Disp: , Rfl:      Omega-3 Fatty Acids (FISH OIL) 1200 MG Oral Cap, Take 1 capsule by mouth daily., Disp: , Rfl:     aspirin 81 MG Oral Tab, Take 1 tablet (81 mg total) by mouth daily., Disp: , Rfl:      Allergies:   Allergies   Allergen Reactions    Penicillins SWELLING       Social History:   Social History     Socioeconomic History    Marital status:     Number of children: 3   Occupational History    Occupation: Waste sales/operations   Tobacco Use    Smoking status: Former     Current packs/day: 0.00     Average packs/day: 0.5 packs/day for 15.0 years (7.5 ttl pk-yrs)     Types: Cigarettes     Start date: 1990     Quit date: 2005     Years since quittin.7    Smokeless tobacco: Never   Vaping Use    Vaping status: Never Used   Substance and Sexual Activity    Alcohol use: Yes     Comment: Occasional    Drug use: No    Sexual activity: Yes     Partners: Female       Medical History:   Past Medical History:    Diabetes (HCC)    Diabetic nephropathy (HCC)    Microalbuminuria    Dyslipidemia associated with type 2 diabetes mellitus (HCC)    History of adenomatous polyp of colon    Hyperlipidemia    Type 2 diabetes mellitus (HCC)       Surgical history:   Past Surgical History:   Procedure Laterality Date    Colonoscopy N/A 2018    Procedure: COLONOSCOPY;  Surgeon: Roberto Ortega MD;  Location: [facility] ENDOSCOPY    Knee arthroscopy Right     Knee arthroscopy Right 1998    Knee arthroscopy Left 2010    Knee surgery Right     ACL repair    Other      Deviated septum    Tonsillectomy           PHYSICAL EXAM  /80   Pulse 90   Wt 187 lb 8 oz (85 kg)   BMI 27.69 kg/m²     General Appearance:  alert, well developed, in no acute distress  Eyes:  normal conjunctivae, sclera., normal sclera and normal pupils  Throat/Neck: normal sound to voice.   Back: no kyphosis  Respiratory:  non-labored. no increased work of breathing.    Lymph Nodes:  No abnormal nodes noted  Skin:  normal moisture  and skin texture  Hematologic:  no excessive bruising  Psychiatric:  oriented to time, self, and place  Bilateral barefoot skin diabetic exam is normal, visualized feet and the appearance is normal.  Bilateral monofilament/sensation of both feet is normal.  Pulsation pedal pulse exam of both lower legs/feet is normal as well.        ASSESSMENT/PLAN:      1. Diabetes Mellitus Type 2, controlled  -controlled  -Congratulated patient on well controlled levels   -Discussed importance of glycemic control to prevent complications of diabetes  -Discussed complications of diabetes include retinopathy, neuropathy, nephropathy and cardiovascular disease  -Discussed importance of SBGM  -Discussed importance of low CHO diet, recommend 45gm per meal or 135gm per day  -Continue Farxiga 10mg PO daily  -Increase Ozempic to 1mg subcutaneous weekly, verbalized understanding of risks and benefits  -Normal foot exam performed today   -Normotensive   -Recheck CMP, lipids, MAB, TSH today     RTC 6 months     9/18/2024    Ninoska Ayala MD

## 2024-11-22 ENCOUNTER — TELEPHONE (OUTPATIENT)
Facility: CLINIC | Age: 54
End: 2024-11-22

## 2024-11-22 NOTE — TELEPHONE ENCOUNTER
Health maintenance updated.     Last colonoscopy done 11/22/2024 by Dr Ortega    Recall placed into Pt Outreach, next due on 11/2034 per Dr Ortega.

## 2025-01-04 DIAGNOSIS — E78.5 HYPERLIPIDEMIA, UNSPECIFIED HYPERLIPIDEMIA TYPE: ICD-10-CM

## 2025-01-04 RX ORDER — ATORVASTATIN CALCIUM 10 MG/1
TABLET, FILM COATED ORAL
Qty: 90 TABLET | Refills: 1 | Status: SHIPPED | OUTPATIENT
Start: 2025-01-04

## 2025-01-04 NOTE — TELEPHONE ENCOUNTER
Endocrine refill protocol for lipid lowering medications    Protocol Criteria:  PASSED     If all below requirements are met, send a 90-day supply with 1 refill per provider protocol.    Verify appointment with Endocrinology completed in the last 6 months or scheduled in the next 3 months.  Lipid panel must have been completed in the last 12 months   ALT result below 80  LDL result below 130    Last completed office visit:9/18/2024 Ninoska Ayala MD   Next scheduled Follow up:   Future Appointments   Date Time Provider Department Center   3/19/2025 10:45 AM Ninoska Ayala MD [facility]      Last Lipid panel date: Cholesterol: 133, done on 9/18/2024.  HDL Cholesterol: 42, done on 9/18/2024.  TriGlycerides 109, done on 9/18/2024.  LDL Cholesterol: 71, done on 9/18/2024.     Last ALT result: Last ALT was 44 done on 9/18/2024.  Last AST was 26 done on 9/18/2024.

## 2025-01-06 RX ORDER — DAPAGLIFLOZIN 10 MG/1
10 TABLET, FILM COATED ORAL DAILY
Qty: 90 TABLET | Refills: 1 | Status: SHIPPED | OUTPATIENT
Start: 2025-01-06

## 2025-01-06 NOTE — TELEPHONE ENCOUNTER
Endocrine Refill protocol for oral and injectable diabetic medications    Protocol Criteria:  PASSED      If all below requirements are met, send a 90-day supply with 1 refill per provider protocol.    Verify appointment with Endocrinology completed in the last 6 months or scheduled in the next 3 months.  Verify A1C has been completed within the last 6 months and is below 8.5%     Last completed office visit: 9/18/2024 Ninoska Ayala MD   Next scheduled Follow up:   Future Appointments   Date Time Provider Department Center   3/19/2025 10:45 AM Ninoska Ayaal MD [facility]      Last A1c result: Last A1c value was 6.7% done 9/18/2024.

## 2025-02-13 ENCOUNTER — HOSPITAL ENCOUNTER (OUTPATIENT)
Dept: CT IMAGING | Age: 55
Discharge: HOME | End: 2025-02-13
Attending: INTERNAL MEDICINE

## 2025-02-13 VITALS
WEIGHT: 187 LBS | HEIGHT: 69 IN | DIASTOLIC BLOOD PRESSURE: 70 MMHG | SYSTOLIC BLOOD PRESSURE: 120 MMHG | BODY MASS INDEX: 27.7 KG/M2

## 2025-02-13 DIAGNOSIS — E11.9 CONTROLLED TYPE 2 DIABETES MELLITUS WITHOUT COMPLICATION, WITHOUT LONG-TERM CURRENT USE OF INSULIN (HCC): ICD-10-CM

## 2025-02-13 NOTE — PROGRESS NOTES
Date of Service 2/13/2025    GARETH TORRES  Date of Birth 8/3/1970    Patient Age: 54 year old    PCP: Lm Rondon MD  [address] [place] [address]    Endocrinology Dr Ayala     Heart Scan Consult  Preliminary Heart Scan Score: 528    Denies CP or SOB     Wants to see cardiologist and will talk with his MD     Previous Screening  Heart Scan Completed Previously: No        Peripheral Vascular Scan Completed Previously: No          Risk Factors  Personal Risk Factors  Family history father MI at 39  Non-alterable Risk Factors: Age  Alterable Risk Factors: Abnormal Cholesterol;Obesity;Diabetes      Body Mass Index  Body mass index is 27.62 kg/m².    Blood Pressure     /70     (Normal =< 120/80,  Elevated = 120-129/ >80,  High Stage1 130-139/80-89 , Stage2 >140/>90)    Lipid Profile  Cholesterol: 133, done on 9/18/2024.  HDL Cholesterol: 42, done on 9/18/2024.  LDL Cholesterol: 71, done on 9/18/2024.  TriGlycerides 109, done on 9/18/2024.    Takes statin   Cholesterol Goals  Value   Total  =< 200   HDL  = > 45 Men = > 55 Women   LDL   =< 100   Triglycerides  =< 150       Glucose and Hemoglobin A1C takes ozempic and farxiga   Lab Results   Component Value Date     (H) 09/18/2024    A1C 6.7 (A) 09/18/2024     (Normal Fasting Glucose < 100mg/dl )    Nurse Review  Risk factor information and results reviewed with Nurse: Yes    Recommended Follow Up:  Consult your physician regarding:: Final Heart Scan Report;Discuss potential for Incidental Finding;Discuss Potential for Score Variance      Recommendations for Change:  Nutrition Changes: Low Saturated Fat;Low Fat Dairy;Low Salt Eating    Cholesterol Modification (goal of therapy depends upon your risk): Increase HDL (Healthy/Good) Normal >45 Men >55 Women (takes statin)    Exercise:  (exercises regularly walks dog 3-4 miles a day)         Weight Management: Decrease Current Weight         Repeat Heart Scan: Discuss with your  Physician              [place] Recommended Resources:  Recommended Resources: Upcoming Classes, Medical Services and Health Library [email or web address]            Caroline TSE, RN        Please Contact the Nurse Heart Line with any Questions or Concerns [phone].

## 2025-03-12 RX ORDER — SEMAGLUTIDE 1.34 MG/ML
1 INJECTION, SOLUTION SUBCUTANEOUS WEEKLY
Qty: 9 ML | Refills: 1 | Status: SHIPPED | OUTPATIENT
Start: 2025-03-12

## 2025-03-12 NOTE — TELEPHONE ENCOUNTER
Endocrine Refill protocol for oral and injectable diabetic medications    Protocol Criteria:  PASSED  Reason: N/A    If all below requirements are met, send a 90-day supply with 1 refill per provider protocol.    Verify appointment with Endocrinology completed in the last 6 months or scheduled in the next 3 months.  Verify A1C has been completed within the last 6 months and is below 8.5%     Last completed office visit: 9/18/2024 Ninoska Ayala MD   Next scheduled Follow up:   Future Appointments   Date Time Provider Department Center   8/6/2025 11:00 AM Ninoska Ayala MD [facility]      Last A1c result: Last A1c value was 6.7% done 9/18/2024.

## 2025-03-26 DIAGNOSIS — E11.65 UNCONTROLLED TYPE 2 DIABETES MELLITUS WITH HYPERGLYCEMIA (HCC): ICD-10-CM

## 2025-03-27 RX ORDER — DAPAGLIFLOZIN 10 MG/1
10 TABLET, FILM COATED ORAL DAILY
Qty: 90 TABLET | Refills: 1 | Status: SHIPPED | OUTPATIENT
Start: 2025-03-27

## 2025-03-27 RX ORDER — SEMAGLUTIDE 1.34 MG/ML
1 INJECTION, SOLUTION SUBCUTANEOUS WEEKLY
Qty: 9 ML | Refills: 1 | Status: SHIPPED | OUTPATIENT
Start: 2025-03-27

## 2025-03-27 NOTE — TELEPHONE ENCOUNTER
Per Last office visit note DM was controlled and pt advised to RTC 6 months. Current appt scheduled in August 2025. Confirmed doses per Last office visit note. Routed to APRN for review.       Endocrine Refill protocol for oral and injectable diabetic medications    Protocol Criteria:  FAILED  Reason: No Visit in required time frame    If all below requirements are met, send a 90-day supply with 1 refill per provider protocol.    Verify appointment with Endocrinology completed in the last 6 months or scheduled in the next 3 months.  Verify A1C has been completed within the last 6 months and is below 8.5%     Last completed office visit: 9/18/2024 Ninoska Ayala MD   Next scheduled Follow up:   Future Appointments   Date Time Provider Department Center   8/6/2025 11:00 AM Ninoska Ayala MD [facility]      Last A1c result: Last A1c value was 6.7% done 9/18/2024.

## 2025-04-14 ENCOUNTER — TELEPHONE (OUTPATIENT)
Dept: ENDOCRINOLOGY CLINIC | Facility: CLINIC | Age: 55
End: 2025-04-14

## 2025-04-14 NOTE — TELEPHONE ENCOUNTER
Medication Quantity Refills Start End   semaglutide (OZEMPIC, 1 MG/DOSE,) 4 MG/3ML Subcutaneous Solution Pen-injector 9 mL 1 3/27/2025 --   Sig:   Inject 1 mg into the skin once a week.     PA started   Key:[id number]

## 2025-05-20 DIAGNOSIS — E11.65 UNCONTROLLED TYPE 2 DIABETES MELLITUS WITH HYPERGLYCEMIA (HCC): ICD-10-CM

## 2025-05-21 RX ORDER — SEMAGLUTIDE 1.34 MG/ML
1 INJECTION, SOLUTION SUBCUTANEOUS WEEKLY
Qty: 9 ML | Refills: 1 | Status: SHIPPED | OUTPATIENT
Start: 2025-05-21

## 2025-05-21 RX ORDER — DAPAGLIFLOZIN 10 MG/1
10 TABLET, FILM COATED ORAL DAILY
Qty: 90 TABLET | Refills: 1 | Status: SHIPPED | OUTPATIENT
Start: 2025-05-21

## 2025-05-21 NOTE — TELEPHONE ENCOUNTER
Endocrine Refill protocol for oral and injectable diabetic medications    Protocol Criteria:  FAILED  Reason: No labs completed in required time frame - A1C    If all below requirements are met, send a 90-day supply with 1 refill per provider protocol.    Verify appointment with Endocrinology completed in the last 6 months or scheduled in the next 3 months.  Verify A1C has been completed within the last 6 months and is below 8.5%     Last completed office visit: 9/18/2024 Ninoska Ayala MD   Next scheduled Follow up:   Future Appointments   Date Time Provider Department Center   8/6/2025 11:00 AM Ninoska Ayala MD [facility]      Last A1c result: Last A1c value was 6.7% done 9/18/2024.

## 2025-05-28 RX ORDER — DAPAGLIFLOZIN 10 MG/1
10 TABLET, FILM COATED ORAL DAILY
Qty: 90 TABLET | Refills: 1 | Status: SHIPPED | OUTPATIENT
Start: 2025-05-28

## (undated) DEVICE — Device: Brand: DEFENDO AIR/WATER/SUCTION AND BIOPSY VALVE

## (undated) DEVICE — ENDOSCOPY PACK - LOWER: Brand: MEDLINE INDUSTRIES, INC.

## (undated) DEVICE — SNARE OPTMZ PLPCTM TRP

## (undated) DEVICE — LINE MNTR ADLT SET O2 INTMD

## (undated) DEVICE — SNARE CAPTIFLEX MICRO-OVL OLY

## (undated) NOTE — ED AVS SNAPSHOT
Briseyda Oni Thea   MRN: [id number]    Department:  THE [facility] Emergency Department in [place]   Date of Visit:  3/5/2020           Disclosure     Insurance plans vary and the physician(s) referred by the ER may not be covered by your plan.  Please co tell this physician (or your personal doctor if your instructions are to return to your personal doctor) about any new or lasting problems. The primary care or specialist physician will see patients referred from the [facility] Emergency Department.  Salvadore Skiff

## (undated) NOTE — LETTER
December 8, 2020    Anne Mcdonough MD  [address] [facility] [address]     Patient: Marcelo Sidhu   YOB: 1970   Date of Visit: 12/8/2020       Dear Dr. Oumou Saini MD:    Thank you for referring Gordy Garrett to jarret • Heart Disease Paternal Grandfather         CHF age 76s   • Cancer Maternal Grandfather         unknown cancer   • Glaucoma Neg    • Macular degeneration Neg        Social History: Social History    Tobacco Use      Smoking status: Former Smoker        Pa Both eyes: 1.0% Mydriacyl and 2.5% Campos Synephrine @ 1:22 PM            Slit Lamp and Fundus Exam     External Exam       Right Left    External Normal Normal          Slit Lamp Exam       Right Left    Lids/Lashes Meibomian gland dysfunction  Meibomian gl

## (undated) NOTE — LETTER
02/08/21        [address]  [address] [address]      Dear Stan North,    [address] [facility] records indicate that you have outstanding lab work and or testing that was ordered for you and has not yet been completed:  Orders Placed This [place]

## (undated) NOTE — LETTER
August 8, 2017    Hyacinth Vieyra MD  [facility]     Patient: Amairani Sidhu   YOB: 1970   Date of Visit: 8/8/2017       Dear Dr. Janki Ingram MD:    Thank you for referring Erasmo Baptiste to me for evaluat atorvastatin 10 MG Oral Tab TAKE 1 TABLET BY MOUTH DAILY Disp: 90 tablet Rfl: 1   MetFORMIN HCl 500 MG Oral Tab TAKE 1 TABLET BY MOUTH TWO TIMES A DAY WITH MEALS Disp: 180 tablet Rfl: 1   Omega-3 Fatty Acids (FISH OIL) 1200 MG Oral Cap Take 1 capsule by mo Diabetes type II: no background of retinopathy, no signs of neovascularization noted. Discussed ocular and systemic benefits of blood sugar control. Diagnosis and treatment discussed in detail with patient.       No orders of the defined types were placed

## (undated) NOTE — LETTER
September 24, 2019    Shamir Ann DO  [address] [place] [address] [address]     Patient: Yaneth Sidhu   YOB: 1970   Date of Visit: 9/24/2019       Dear Dr. Woo Sethi DO:    Thank you for referring Dav Strong to me Social History: Social History    Tobacco Use      Smoking status: Former Smoker        Packs/day: 0.50        Years: 15.00        Pack years: 7.5        Types: Cigarettes        Quit date: 2005        Years since quittin.7      Smokeless tobacco: Full, Ortho Full, Ortho          Dilation     Both eyes:  Paremyd @ 9:56 AM            Slit Lamp and Fundus Exam     External Exam       Right Left    External Normal Normal          Slit Lamp Exam       Right Left    Lids/Lashes Meibomian gland dysfunct

## (undated) NOTE — LETTER
12/13/2022              [place] [address]         Dear Claribel Silver,    It has come to my attention that you missed your last appointment with me. As you know, regular medical attention is essential to maintaining your health. Please contact the office at your earliest convenience to reschedule. I look forward to seeing you soon.       Sincerely,    Emerald Doty MD  [address] [place] [id number] [phone]        Document electronically generated by:  Sowmya Hoover

## (undated) NOTE — LETTER
September 19, 2018    Campos Hammer MD  [facility]     Patient: Mini Sidhu   YOB: 1970   Date of Visit: 9/19/2018       Dear Dr. Dayna Egan MD:    Thank you for referring Ghazal Quevedo to me for ev Medications:    Current Outpatient Medications:  MetFORMIN HCl 1000 MG Oral Tab TAKE 1 TABLET BY MOUTH TWO TIMES A DAY WITH MEALS Disp: 180 tablet Rfl: 0   Blood Glucose Monitoring Suppl (ACCU-CHEK MATIAS SMARTVIEW) w/Device Does not apply Kit Check Blood gl Left -2.75 +0.50 020 +1.25    Type:  Progressive bifocal                 ASSESSMENT/PLAN:     Diagnoses and Plan:     Diabetes mellitus type 2 without retinopathy ([address]  Diabetes type II: no background of retinopathy, no signs of neovascularization noted.

## (undated) NOTE — LETTER
December 9, 2021    Carleen Clemente MD  [address]     Patient: Luciana Sidhu   YOB: 1970   Date of Visit: 12/9/2021       Dear Dr. Luana Nolen MD:    Thank you for referring Jonny Gonzalez to jarret Diabetes Paternal Grandfather    • Heart Disease Paternal Grandfather         CHF age 76s   • Cancer Maternal Grandfather         unknown cancer   • Glaucoma Neg    • Macular degeneration Neg        Social History: Social History    Tobacco Use      Smokin tablet 0       Allergies:    Penicillins             SWELLING    ROS:     ROS     Positive for: Endocrine, Eyes    Negative for: Constitutional, Gastrointestinal, Neurological, Skin, Genitourinary, Musculoskeletal, HENT, Cardiovascular, Respiratory, Psychi treatment discussed in detail with patient. No orders of the defined types were placed in this encounter.       Meds This Visit:  Requested Prescriptions      No prescriptions requested or ordered in this encounter        Follow up instructions:  Ret

## (undated) NOTE — LETTER
10/1/2019              Vena Median Ronyogousis        [address] [facility] [address]         Dear Jean Black,    [address] [facility] records indicate that the tests ordered for you by Sushant Allison MD  have not been done.   If you have, in fact, already complet

## (undated) NOTE — LETTER
May 1, 2024      No Recipients     Patient: Albert Sidhu   YOB: 1970   Date of Visit: 5/1/2024       Dear Dr. Echols Recipients:    Thank you for referring Albert Sidhu to me for evaluation. Here is my assessment and plan of care:    Albert Sidhu is a 53 year old male.    HPI:     HPI    Patient is here for a diabetic exam.  Patient states his vision is stable.  He sees Dr. Ann for his glasses and CL's.  He is happy with his glasses and CL's    Pt has been a diabetic for 12 years       Pt's diabetes is currently controlled by injectable  Pt doesn't checks BS    Pt's last blood sugar was 112 couple of weeks  Last HA1C was 5.6 on 3/20/24  Endocrinologist: Dr Ayala  Last edited by Mine Tompkins OT on 5/1/2024  9:46 AM.        Patient History:  Past Medical History:    Diabetes (HCC)    Diabetic nephropathy (HCC)    Microalbuminuria    Dyslipidemia associated with type 2 diabetes mellitus (HCC)    History of adenomatous polyp of colon    Type 2 diabetes mellitus (HCC)       Surgical History: Albert Sidhu has a past surgical history that includes other (1992) (Deviated septum); knee surgery (Right, 1994) (ACL repair); tonsillectomy (1995); knee arthroscopy (Right, 1996); knee arthroscopy (Right, 1998); knee arthroscopy (Left, 2010); and colonoscopy (N/A, 11/30/2018) (Procedure: COLONOSCOPY;  Surgeon: Roberto Ortega MD;  Location: [facility] ENDOSCOPY).    Family History   Problem Relation Age of Onset    Diabetes Father     Heart Disease Father         MI age 38    Cancer Father 72        lung cancer    Other (Lung Cancer) Father 75    Cancer Sister 42        lung cancer    Other (Lung Cancer) Sister 42    Diabetes Paternal Grandmother     Diabetes Paternal Grandfather     Heart Disease Paternal Grandfather         CHF age 70s    Cancer Maternal Grandfather         unknown cancer    Glaucoma Neg     Macular degeneration Neg        Social History:   Social History      Socioeconomic History    Marital status:     Number of children: 3   Occupational History    Occupation: Waste sales/operations   Tobacco Use    Smoking status: Former     Current packs/day: 0.00     Average packs/day: 0.5 packs/day for 15.0 years (7.5 ttl pk-yrs)     Types: Cigarettes     Start date: 1990     Quit date: 2005     Years since quittin.3    Smokeless tobacco: Never   Vaping Use    Vaping status: Never Used   Substance and Sexual Activity    Alcohol use: Yes     Comment: Occasional    Drug use: No    Sexual activity: Yes     Partners: Female       Medications:  Current Outpatient Medications   Medication Sig Dispense Refill    Na Sulfate-K Sulfate-Mg Sulf (SUPREP BOWEL PREP KIT) 17.5-3.13-1.6 GM/177ML Oral Solution Take as directed 1 each 0    semaglutide 2 MG/3ML Subcutaneous Solution Pen-injector Inject 0.5 mg into the skin once a week. 9 mL 1    atorvastatin 10 MG Oral Tab TAKE 1 TABLET BY MOUTH IN THE EVENING 90 tablet 1    dapagliflozin (FARXIGA) 10 MG Oral Tab Take 1 tablet (10 mg total) by mouth daily. 90 tablet 1    Multiple Vitamin (MULTI-DAY) Oral Tab Take 1 tablet by mouth daily.      Omega-3 Fatty Acids (FISH OIL) 1200 MG Oral Cap Take 1 capsule by mouth daily.      aspirin 81 MG Oral Tab Take 1 tablet (81 mg total) by mouth daily.         Allergies:  Allergies   Allergen Reactions    Penicillins SWELLING       ROS:     ROS    Positive for: Eyes  Last edited by Mine Tompkins OT on 2024  9:46 AM.          PHYSICAL EXAM:     Base Eye Exam       Visual Acuity (Snellen - Linear)         Right Left    Dist cc 20/20 20/15    Near sc 20/20 20/20      Correction: Glasses              Tonometry (Icare, 10:00 AM)         Right Left    Pressure 17 16              Pupils         Pupils    Right PERRL    Left PERRL              Visual Fields         Left Right     Full Full              Extraocular Movement         Right Left     Full, Ortho Full, Ortho               Neuro/Psych       Oriented x3: Yes    Mood/Affect: Normal              Dilation       Both eyes: 1.0% Mydriacyl and 2.5% Campos Synephrine @ 10:00 AM                  Slit Lamp and Fundus Exam       External Exam         Right Left    External Normal Normal              Slit Lamp Exam         Right Left    Lids/Lashes Meibomian gland dysfunction  Meibomian gland dysfunction    Conjunctiva/Sclera Normal Normal    Cornea Clear Clear    Anterior Chamber Deep and quiet Deep and quiet    Iris Normal Normal    Lens Clear Clear    Vitreous Clear Clear              Fundus Exam         Right Left    Disc Sloping margin, Temporal crescent Sloping margin, Temporal crescent    C/D Ratio 0.45 0.45    Macula Normal- no BDR Normal- no BDR    Vessels Normal Normal    Periphery Normal Normal                  Refraction       Wearing Rx         Sphere Cylinder Axis    Right -2.25 +0.50 158    Left -2.50 +0.50 012      Age: 1yr    Type: Distance only   Patient does not want a refraction.                    ASSESSMENT/PLAN:     Diagnoses and Plan:     Diabetes mellitus type 2 without retinopathy (HCC)  Diabetes type II: no background of retinopathy, no signs of neovascularization noted.  Discussed ocular and systemic benefits of blood sugar control.  Diagnosis and treatment discussed in detail with patient.  Stressed importance of yearly eye exams due to diabetes.        No orders of the defined types were placed in this encounter.      Meds This Visit:  Requested Prescriptions      No prescriptions requested or ordered in this encounter        Follow up instructions:  Return in about 1 year (around 5/1/2025) for Diabetic eye exam.    5/1/2024  Scribed by: Major Brown MD        If you have questions, please do not hesitate to call me. I look forward to following Albert along with you.    Sincerely,        Major Brown MD        CC:   No Recipients    Document electronically generated by: Major Brown MD